# Patient Record
Sex: FEMALE | Race: WHITE | NOT HISPANIC OR LATINO | Employment: FULL TIME | URBAN - METROPOLITAN AREA
[De-identification: names, ages, dates, MRNs, and addresses within clinical notes are randomized per-mention and may not be internally consistent; named-entity substitution may affect disease eponyms.]

---

## 2023-08-08 ENCOUNTER — TELEPHONE (OUTPATIENT)
Dept: PRIMARY CARE | Facility: CLINIC | Age: 33
End: 2023-08-08
Payer: COMMERCIAL

## 2023-08-08 PROBLEM — R92.30 DENSE BREAST TISSUE ON MAMMOGRAM: Status: ACTIVE | Noted: 2023-08-08

## 2023-08-08 PROBLEM — I45.81 PROLONGED QT SYNDROME: Status: ACTIVE | Noted: 2023-08-08

## 2023-08-08 PROBLEM — R30.0 DYSURIA: Status: RESOLVED | Noted: 2023-08-08 | Resolved: 2023-08-08

## 2023-08-08 PROBLEM — R10.9 FLANK PAIN, ACUTE: Status: ACTIVE | Noted: 2023-08-08

## 2023-08-08 PROBLEM — R92.8 MAMMOGRAM ABNORMAL: Status: ACTIVE | Noted: 2023-08-08

## 2023-08-08 PROBLEM — G91.9 HYDROCEPHALUS, ADULT (MULTI): Status: ACTIVE | Noted: 2023-08-08

## 2023-08-08 PROBLEM — G43.909 MIGRAINE, UNSPECIFIED, NOT INTRACTABLE, WITHOUT STATUS MIGRAINOSUS: Status: ACTIVE | Noted: 2023-08-08

## 2023-08-08 PROBLEM — N05.8: Status: ACTIVE | Noted: 2023-08-08

## 2023-08-08 PROBLEM — R41.840 INATTENTION: Status: ACTIVE | Noted: 2023-08-08

## 2023-08-08 PROBLEM — R19.7 DIARRHEA: Status: RESOLVED | Noted: 2023-08-08 | Resolved: 2023-08-08

## 2023-08-08 PROBLEM — M54.50 LOW BACK PAIN: Status: ACTIVE | Noted: 2023-08-08

## 2023-08-08 PROBLEM — F41.8 SITUATIONAL ANXIETY: Status: ACTIVE | Noted: 2023-08-08

## 2023-08-08 PROBLEM — N93.8 DUB (DYSFUNCTIONAL UTERINE BLEEDING): Status: ACTIVE | Noted: 2023-08-08

## 2023-08-08 PROBLEM — G44.009 CLUSTER HEADACHE: Status: ACTIVE | Noted: 2023-08-08

## 2023-08-08 PROBLEM — R10.31 ABDOMINAL PAIN, RLQ (RIGHT LOWER QUADRANT): Status: ACTIVE | Noted: 2023-08-08

## 2023-08-08 PROBLEM — G43.009 MIGRAINE WITHOUT AURA AND WITHOUT STATUS MIGRAINOSUS, NOT INTRACTABLE: Status: ACTIVE | Noted: 2023-08-08

## 2023-08-08 PROBLEM — G47.00 INSOMNIA: Status: ACTIVE | Noted: 2023-08-08

## 2023-08-08 PROBLEM — R79.89 LOW VITAMIN D LEVEL: Status: ACTIVE | Noted: 2023-08-08

## 2023-08-08 RX ORDER — AMITRIPTYLINE HYDROCHLORIDE 10 MG/1
10 TABLET, FILM COATED ORAL NIGHTLY
COMMUNITY
Start: 2023-01-30

## 2023-08-08 RX ORDER — SUMATRIPTAN 50 MG/1
TABLET, FILM COATED ORAL
COMMUNITY

## 2023-08-08 RX ORDER — TRAMADOL HYDROCHLORIDE 50 MG/1
50 TABLET ORAL EVERY 6 HOURS
COMMUNITY
Start: 2023-03-23

## 2023-08-08 RX ORDER — SUMATRIPTAN SUCCINATE 4 MG/.5ML
INJECTION, SOLUTION SUBCUTANEOUS
COMMUNITY
Start: 2023-02-08

## 2023-08-08 RX ORDER — METOPROLOL SUCCINATE 25 MG/1
TABLET, EXTENDED RELEASE ORAL
COMMUNITY

## 2023-08-08 RX ORDER — RIZATRIPTAN BENZOATE 10 MG/1
TABLET ORAL
COMMUNITY
Start: 2023-02-08

## 2023-08-08 RX ORDER — IBUPROFEN 800 MG/1
TABLET ORAL
COMMUNITY
Start: 2022-12-09

## 2023-08-08 RX ORDER — GALCANEZUMAB 100 MG/ML
INJECTION, SOLUTION SUBCUTANEOUS
COMMUNITY
Start: 2023-02-08

## 2023-08-08 RX ORDER — LIDOCAINE 50 MG/G
PATCH TOPICAL
COMMUNITY
Start: 2023-03-23

## 2023-08-08 RX ORDER — CYCLOBENZAPRINE HCL 10 MG
TABLET ORAL
COMMUNITY
Start: 2019-06-19

## 2023-08-08 RX ORDER — ONDANSETRON 4 MG/1
4 TABLET, ORALLY DISINTEGRATING ORAL 3 TIMES DAILY
COMMUNITY
Start: 2023-03-23

## 2023-08-08 RX ORDER — MELOXICAM 15 MG/1
15 TABLET ORAL DAILY PRN
COMMUNITY
Start: 2023-07-12

## 2023-08-08 NOTE — TELEPHONE ENCOUNTER
Pt called having abd pain and vomiting x 5 days. Lupe her for tomorrow 8 am squeeze. She wanted seen today no openings. She said if she got worse she would go to the ER.

## 2023-08-09 ENCOUNTER — OFFICE VISIT (OUTPATIENT)
Dept: PRIMARY CARE | Facility: CLINIC | Age: 33
End: 2023-08-09
Payer: COMMERCIAL

## 2023-08-09 VITALS
DIASTOLIC BLOOD PRESSURE: 82 MMHG | TEMPERATURE: 98.2 F | WEIGHT: 183 LBS | OXYGEN SATURATION: 99 % | HEART RATE: 110 BPM | BODY MASS INDEX: 25.7 KG/M2 | SYSTOLIC BLOOD PRESSURE: 142 MMHG

## 2023-08-09 DIAGNOSIS — G91.9 HYDROCEPHALUS, ADULT (MULTI): ICD-10-CM

## 2023-08-09 DIAGNOSIS — E86.0 DEHYDRATION: Primary | ICD-10-CM

## 2023-08-09 DIAGNOSIS — R11.2 NAUSEA AND VOMITING, UNSPECIFIED VOMITING TYPE: ICD-10-CM

## 2023-08-09 DIAGNOSIS — R10.13 EPIGASTRIC PAIN: ICD-10-CM

## 2023-08-09 DIAGNOSIS — R19.7 DIARRHEA OF PRESUMED INFECTIOUS ORIGIN: ICD-10-CM

## 2023-08-09 PROCEDURE — 99214 OFFICE O/P EST MOD 30 MIN: CPT | Performed by: FAMILY MEDICINE

## 2023-08-09 PROCEDURE — 1036F TOBACCO NON-USER: CPT | Performed by: FAMILY MEDICINE

## 2023-08-09 ASSESSMENT — ENCOUNTER SYMPTOMS
CONSTIPATION: 0
DIARRHEA: 1
NAUSEA: 1
VOMITING: 1
DYSURIA: 0
FEVER: 1
ABDOMINAL PAIN: 1
BLOOD IN STOOL: 0

## 2023-08-09 NOTE — PATIENT INSTRUCTIONS
Suspected gastroenteritis  Patient orthostatic with dehydration    Discussed option with the patient we can do medications, push the fluids get blood work  Given that she is orthostatic and feeling pretty unwell would recommend IV fluids in the ER patient agrees sent over to the ER

## 2023-08-09 NOTE — PROGRESS NOTES
Subjective   Patient ID: Alexandria Dinero is a 33 y.o. female who presents for Abdominal Pain.    Cramping  epigastric after eating 15-20 min ,  anything     N/V   5-6 times with vomiting over last week   Diarrhea   2-3 times per day     Chills feverish   No specific triggers     Emgality for 5-6 months   No relief with TUMs   Kaopectate no relief   Tried immodium , pepto     Abdominal Pain  This is a new problem. The current episode started in the past 7 days. The onset quality is sudden. The problem occurs constantly. The most recent episode lasted 1 week. The problem has been unchanged. The pain is located in the epigastric region and RUQ. The pain is at a severity of 6/10. The pain is moderate. The quality of the pain is dull, cramping and sharp. The abdominal pain radiates to the RUQ and epigastric region. Associated symptoms include diarrhea, a fever, nausea and vomiting. Pertinent negatives include no constipation or dysuria. The pain is aggravated by eating. The pain is relieved by Nothing. She has tried antacids for the symptoms. The treatment provided no relief.        Review of Systems   Constitutional:  Positive for fever.   Gastrointestinal:  Positive for abdominal pain, diarrhea, nausea and vomiting. Negative for blood in stool and constipation.   Genitourinary:  Negative for dysuria.       Objective   /68   Pulse 110   Temp 36.8 °C (98.2 °F)   Wt 83 kg (183 lb)   SpO2 99%   BMI 25.70 kg/m²     Physical Exam  Constitutional:       Appearance: Normal appearance. She is well-developed.   Cardiovascular:      Rate and Rhythm: Normal rate and regular rhythm.      Heart sounds: Normal heart sounds. No murmur heard.  Pulmonary:      Effort: Pulmonary effort is normal.      Breath sounds: Normal breath sounds.   Abdominal:      General: Abdomen is flat.      Palpations: Abdomen is soft.      Tenderness: There is abdominal tenderness. There is no guarding.      Comments: Tenderness epigastric area with  deep palpation  No guarding or rebound   Neurological:      General: No focal deficit present.      Mental Status: She is alert.         Assessment/Plan   Problem List Items Addressed This Visit       Hydrocephalus, adult (CMS/[facility])     Other Visit Diagnoses       Dehydration    -  Primary    Epigastric pain        Nausea and vomiting, unspecified vomiting type        Diarrhea of presumed infectious origin

## 2023-10-06 ENCOUNTER — PHARMACY VISIT (OUTPATIENT)
Dept: PHARMACY | Facility: CLINIC | Age: 33
End: 2023-10-06
Payer: MEDICAID

## 2023-10-06 ENCOUNTER — SPECIALTY PHARMACY (OUTPATIENT)
Dept: PHARMACY | Facility: CLINIC | Age: 33
End: 2023-10-06

## 2023-10-06 PROCEDURE — RXMED WILLOW AMBULATORY MEDICATION CHARGE

## 2023-10-26 ENCOUNTER — OFFICE VISIT (OUTPATIENT)
Dept: PRIMARY CARE | Facility: CLINIC | Age: 33
End: 2023-10-26
Payer: COMMERCIAL

## 2023-10-26 ENCOUNTER — ANCILLARY PROCEDURE (OUTPATIENT)
Dept: RADIOLOGY | Facility: CLINIC | Age: 33
End: 2023-10-26
Payer: COMMERCIAL

## 2023-10-26 VITALS
DIASTOLIC BLOOD PRESSURE: 67 MMHG | OXYGEN SATURATION: 99 % | WEIGHT: 183 LBS | HEIGHT: 71 IN | SYSTOLIC BLOOD PRESSURE: 102 MMHG | TEMPERATURE: 98.1 F | HEART RATE: 94 BPM | BODY MASS INDEX: 25.62 KG/M2

## 2023-10-26 DIAGNOSIS — S69.91XA INJURY OF FINGER OF RIGHT HAND, INITIAL ENCOUNTER: ICD-10-CM

## 2023-10-26 DIAGNOSIS — G43.009 MIGRAINE WITHOUT AURA AND WITHOUT STATUS MIGRAINOSUS, NOT INTRACTABLE: ICD-10-CM

## 2023-10-26 DIAGNOSIS — S69.91XA INJURY OF FINGER OF RIGHT HAND, INITIAL ENCOUNTER: Primary | ICD-10-CM

## 2023-10-26 DIAGNOSIS — G91.9 HYDROCEPHALUS, ADULT (MULTI): ICD-10-CM

## 2023-10-26 PROCEDURE — 1036F TOBACCO NON-USER: CPT | Performed by: FAMILY MEDICINE

## 2023-10-26 PROCEDURE — 73140 X-RAY EXAM OF FINGER(S): CPT | Mod: RT,FY

## 2023-10-26 PROCEDURE — 99214 OFFICE O/P EST MOD 30 MIN: CPT | Performed by: FAMILY MEDICINE

## 2023-10-26 PROCEDURE — 73140 X-RAY EXAM OF FINGER(S): CPT | Mod: RIGHT SIDE | Performed by: RADIOLOGY

## 2023-10-26 RX ORDER — AMOXICILLIN AND CLAVULANATE POTASSIUM 875; 125 MG/1; MG/1
875 TABLET, FILM COATED ORAL 2 TIMES DAILY
Qty: 20 TABLET | Refills: 0 | Status: SHIPPED | OUTPATIENT
Start: 2023-10-26 | End: 2023-11-05

## 2023-10-26 ASSESSMENT — PAIN SCALES - GENERAL: PAINLEVEL: 4

## 2023-10-26 NOTE — PATIENT INSTRUCTIONS
Xray of the finger to rule out distal fracture    Discussed signs of compartment syndrome and infection with the patient  Take antibiotics as directed  If numbness or tingling with increased swelling or decreased feeling in the finger needs evaluation    Migraines with hydrocephalus: Continue follow-up with neurology

## 2023-10-26 NOTE — PROGRESS NOTES
"Subjective   Patient ID: Alexandria Dinero is a 33 y.o. female who presents for finger accident; states this occurred yesterday afternoon; finger was smashed in a car door. Soaking her finger in Epsom salts and warm water and taking Tylenol and Ibuprofen.     Slammed in car dog yesterday   Having significant pain at the tip of the finger but tolerable, had some bleeding but it stopped and the nail is lifting  Pulsatile pain, not numbness    Up-to-date on tetanus    Has migraines: Saw neurologist, repeated CT  She does have increase in her ventricular size, she is being referred and may need to get a shunt  She is also taking Emgality which is helping with the migraines         Review of Systems    Objective   /67   Pulse 94   Temp 36.7 °C (98.1 °F)   Ht 1.797 m (5' 10.75\")   Wt 83 kg (183 lb)   SpO2 99%   BMI 25.70 kg/m²     Physical Exam  Constitutional:       Appearance: Normal appearance.   HENT:      Head: Normocephalic.   Musculoskeletal:      Comments: Right middle finger with bruising and edema on the distal finger, range of motion intact, flexion intact  Sensation intact, some bruising  Lifting of the nail without active bleeding   Neurological:      Mental Status: She is alert.   Psychiatric:         Mood and Affect: Mood normal.         Behavior: Behavior normal.         Assessment/Plan   Problem List Items Addressed This Visit             ICD-10-CM    Hydrocephalus, adult (CMS/[facility]) G91.9    Migraine without aura and without status migrainosus, not intractable G43.009     Other Visit Diagnoses         Codes    Injury of finger of right hand, initial encounter    -  Primary S69.91XA    Relevant Medications    amoxicillin-pot clavulanate (Augmentin) 875-125 mg tablet    Other Relevant Orders    XR fingers right 2+ views               "

## 2023-10-31 ENCOUNTER — SPECIALTY PHARMACY (OUTPATIENT)
Dept: PHARMACY | Facility: CLINIC | Age: 33
End: 2023-10-31

## 2023-10-31 DIAGNOSIS — G44.029 CHRONIC CLUSTER HEADACHE, NOT INTRACTABLE: ICD-10-CM

## 2023-10-31 RX ORDER — GALCANEZUMAB 100 MG/ML
INJECTION, SOLUTION SUBCUTANEOUS
Qty: 3 ML | Refills: 6 | Status: SHIPPED | OUTPATIENT
Start: 2023-10-31 | End: 2024-10-30

## 2023-11-03 ENCOUNTER — PHARMACY VISIT (OUTPATIENT)
Dept: PHARMACY | Facility: CLINIC | Age: 33
End: 2023-11-03
Payer: MEDICAID

## 2023-11-03 ENCOUNTER — SPECIALTY PHARMACY (OUTPATIENT)
Dept: PHARMACY | Facility: CLINIC | Age: 33
End: 2023-11-03

## 2023-11-03 PROCEDURE — RXMED WILLOW AMBULATORY MEDICATION CHARGE

## 2023-11-08 ENCOUNTER — APPOINTMENT (OUTPATIENT)
Dept: ORTHOPEDIC SURGERY | Facility: CLINIC | Age: 33
End: 2023-11-08
Payer: COMMERCIAL

## 2023-11-28 ENCOUNTER — SPECIALTY PHARMACY (OUTPATIENT)
Dept: PHARMACY | Facility: CLINIC | Age: 33
End: 2023-11-28

## 2023-11-28 ENCOUNTER — PHARMACY VISIT (OUTPATIENT)
Dept: PHARMACY | Facility: CLINIC | Age: 33
End: 2023-11-28
Payer: MEDICAID

## 2023-11-28 PROCEDURE — RXMED WILLOW AMBULATORY MEDICATION CHARGE

## 2023-11-28 NOTE — PROGRESS NOTES
[facility] Clinical Note    Alexandria Dinero is a 33 y.o. female, who is on the specialty pharmacy service for management of: Migraine Core with status of: (Enrolled)     Alexandria was contacted on 11/28/2023.    Refer to the encounter summary report for documentation details about patient counseling and education.      Medication Adherence  The importance of adherence was discussed with the patient and they were advised to take the medication as prescribed by their provider. Alexandria was encouraged to call her physician's office if they have a question regarding a missed dose.     Conclusion  Rate your quality of life on scale of 1-10: 10 - Completely satisfied  Rate your satisfaction with  Specialty Pharmacy on scale of 1-10: 10 - Completely satisfied      Patient advised to contact the pharmacy if there are any changes to her medication list, including prescriptions, OTC medications, herbal products, or supplements. Patient was advised of [facility]’s dispensing process, refill timeline, contact information ([phone]), and patient management follow up. Patient confirmed understanding of education conducted during assessment. All patient questions and concerns were addressed to the best of my ability. Patient was encouraged to contact the specialty pharmacy with any questions or concerns.    Confirmed follow-up outreaches are properly scheduled. Reviewed goals of therapy in the program targets.    Sy Kingsley, ViridianaD

## 2023-12-07 ENCOUNTER — SPECIALTY PHARMACY (OUTPATIENT)
Dept: PHARMACY | Facility: CLINIC | Age: 33
End: 2023-12-07

## 2023-12-12 ENCOUNTER — SPECIALTY PHARMACY (OUTPATIENT)
Dept: PHARMACY | Facility: CLINIC | Age: 33
End: 2023-12-12

## 2023-12-21 ENCOUNTER — SPECIALTY PHARMACY (OUTPATIENT)
Dept: PHARMACY | Facility: CLINIC | Age: 33
End: 2023-12-21

## 2023-12-22 ENCOUNTER — OFFICE VISIT (OUTPATIENT)
Dept: PRIMARY CARE | Facility: CLINIC | Age: 33
End: 2023-12-22
Payer: COMMERCIAL

## 2023-12-22 VITALS
HEART RATE: 81 BPM | OXYGEN SATURATION: 98 % | WEIGHT: 183 LBS | DIASTOLIC BLOOD PRESSURE: 69 MMHG | TEMPERATURE: 98.1 F | SYSTOLIC BLOOD PRESSURE: 108 MMHG | BODY MASS INDEX: 25.62 KG/M2 | HEIGHT: 71 IN

## 2023-12-22 DIAGNOSIS — R10.31 ABDOMINAL PAIN, RLQ (RIGHT LOWER QUADRANT): ICD-10-CM

## 2023-12-22 DIAGNOSIS — N30.00 ACUTE CYSTITIS WITHOUT HEMATURIA: Primary | ICD-10-CM

## 2023-12-22 LAB
POC APPEARANCE, URINE: CLEAR
POC BILIRUBIN, URINE: NEGATIVE
POC BLOOD, URINE: ABNORMAL
POC COLOR, URINE: YELLOW
POC GLUCOSE, URINE: NEGATIVE MG/DL
POC KETONES, URINE: NEGATIVE MG/DL
POC LEUKOCYTES, URINE: ABNORMAL
POC NITRITE,URINE: NEGATIVE
POC PH, URINE: 6 PH
POC PROTEIN, URINE: ABNORMAL MG/DL
POC SPECIFIC GRAVITY, URINE: >=1.03
POC UROBILINOGEN, URINE: 0.2 EU/DL

## 2023-12-22 PROCEDURE — 99213 OFFICE O/P EST LOW 20 MIN: CPT | Performed by: FAMILY MEDICINE

## 2023-12-22 PROCEDURE — 81003 URINALYSIS AUTO W/O SCOPE: CPT | Performed by: FAMILY MEDICINE

## 2023-12-22 PROCEDURE — 87186 SC STD MICRODIL/AGAR DIL: CPT

## 2023-12-22 PROCEDURE — 87086 URINE CULTURE/COLONY COUNT: CPT

## 2023-12-22 PROCEDURE — 1036F TOBACCO NON-USER: CPT | Performed by: FAMILY MEDICINE

## 2023-12-22 RX ORDER — CEPHALEXIN 500 MG/1
500 CAPSULE ORAL 3 TIMES DAILY
Qty: 15 CAPSULE | Refills: 0 | Status: SHIPPED | OUTPATIENT
Start: 2023-12-22 | End: 2023-12-27

## 2023-12-22 RX ORDER — DEXTRAN, HYPROMELLOSE 1; 3 MG/ML; MG/ML
SOLUTION/ DROPS OPHTHALMIC
COMMUNITY
Start: 2023-12-13

## 2023-12-22 ASSESSMENT — ENCOUNTER SYMPTOMS
CHILLS: 1
FLANK PAIN: 1

## 2023-12-22 NOTE — PROGRESS NOTES
"Subjective   Patient ID: Alexandria Dinero is a 33 y.o. female who presents for UTI.    UTI   This is a new problem. The current episode started in the past 7 days. The problem occurs every urination. The problem has been gradually worsening. The quality of the pain is described as stabbing and aching. The pain is at a severity of 5/10. The pain is mild. There has been no fever. Associated symptoms include chills, flank pain and urgency.        Review of Systems   Constitutional:  Positive for chills.   Genitourinary:  Positive for flank pain and urgency.       Objective   Ht 1.797 m (5' 10.75\")   Wt 83 kg (183 lb)   BMI 25.70 kg/m²     Physical Exam  HENT:      Head: Normocephalic and atraumatic.      Nose: Nose normal.      Mouth/Throat:      Mouth: Mucous membranes are moist.      Pharynx: No oropharyngeal exudate.   Eyes:      Extraocular Movements: Extraocular movements intact.      Conjunctiva/sclera: Conjunctivae normal.      Pupils: Pupils are equal, round, and reactive to light.   Cardiovascular:      Rate and Rhythm: Normal rate and regular rhythm.   Pulmonary:      Effort: Pulmonary effort is normal.   Abdominal:      General: There is no distension.      Palpations: Abdomen is soft.      Tenderness: There is no right CVA tenderness or left CVA tenderness.   Musculoskeletal:      Cervical back: Normal range of motion and neck supple.   Lymphadenopathy:      Cervical: No cervical adenopathy.   Neurological:      General: No focal deficit present.      Mental Status: She is alert.   Psychiatric:         Attention and Perception: Attention normal.         Speech: Speech normal.         Behavior: Behavior is cooperative.         Assessment/Plan   Diagnoses and all orders for this visit:  Acute cystitis without hematuria  Comments:  Push water  Risk benefits discussed  Add medication as directed  Orders:  -     cephalexin (Keflex) 500 mg capsule; Take 1 capsule (500 mg) by mouth 3 times a day for 5 " days.  Abdominal pain, RLQ (right lower quadrant)  -     POCT UA Automated manually resulted  -     Urine Culture; Future    Recheck 1 week if not better sooner if worse

## 2023-12-25 LAB — BACTERIA UR CULT: ABNORMAL

## 2023-12-27 ENCOUNTER — SPECIALTY PHARMACY (OUTPATIENT)
Dept: PHARMACY | Facility: CLINIC | Age: 33
End: 2023-12-27

## 2023-12-27 PROCEDURE — RXMED WILLOW AMBULATORY MEDICATION CHARGE

## 2023-12-28 ENCOUNTER — TELEPHONE (OUTPATIENT)
Dept: PRIMARY CARE | Facility: CLINIC | Age: 33
End: 2023-12-28
Payer: COMMERCIAL

## 2023-12-28 NOTE — TELEPHONE ENCOUNTER
Result Communication    Resulted Orders   POCT UA Automated manually resulted   Result Value Ref Range    POC Color, Urine Yellow Straw, Yellow, Light-Yellow    POC Appearance, Urine Clear Clear    POC Glucose, Urine NEGATIVE NEGATIVE mg/dl    POC Bilirubin, Urine NEGATIVE NEGATIVE    POC Ketones, Urine NEGATIVE NEGATIVE mg/dl    POC Specific Gravity, Urine >=1.030 1.005 - 1.035    POC Blood, Urine SMALL (1+) (A) NEGATIVE    POC PH, Urine 6.0 No Reference Range Established PH    POC Protein, Urine 100 (2+) (A) NEGATIVE, 30 (1+) mg/dl    POC Urobilinogen, Urine 0.2 0.2, 1.0 EU/DL    Poc Nitrite, Urine NEGATIVE NEGATIVE    POC Leukocytes, Urine SMALL (1+) (A) NEGATIVE   Urine Culture   Result Value Ref Range    Urine Culture >100,000 Klebsiella pneumoniae/variicola (A)        9:48 AM      Results were successfully communicated with the patient and they acknowledged their understanding.

## 2024-01-02 ENCOUNTER — PHARMACY VISIT (OUTPATIENT)
Dept: PHARMACY | Facility: CLINIC | Age: 34
End: 2024-01-02
Payer: MEDICAID

## 2024-01-24 DIAGNOSIS — R92.8 MAMMOGRAM ABNORMAL: Primary | ICD-10-CM

## 2024-01-30 ENCOUNTER — SPECIALTY PHARMACY (OUTPATIENT)
Dept: PHARMACY | Facility: CLINIC | Age: 34
End: 2024-01-30

## 2024-01-31 ENCOUNTER — APPOINTMENT (OUTPATIENT)
Dept: RADIOLOGY | Facility: HOSPITAL | Age: 34
End: 2024-01-31
Payer: COMMERCIAL

## 2024-02-08 ENCOUNTER — OFFICE VISIT (OUTPATIENT)
Dept: NEUROLOGY | Facility: CLINIC | Age: 34
End: 2024-02-08
Payer: COMMERCIAL

## 2024-02-08 VITALS — SYSTOLIC BLOOD PRESSURE: 114 MMHG | DIASTOLIC BLOOD PRESSURE: 72 MMHG | RESPIRATION RATE: 16 BRPM | TEMPERATURE: 97.3 F

## 2024-02-08 DIAGNOSIS — G43.009 MIGRAINE WITHOUT AURA AND WITHOUT STATUS MIGRAINOSUS, NOT INTRACTABLE: ICD-10-CM

## 2024-02-08 PROCEDURE — 1036F TOBACCO NON-USER: CPT | Performed by: PSYCHIATRY & NEUROLOGY

## 2024-02-08 PROCEDURE — 99212 OFFICE O/P EST SF 10 MIN: CPT | Performed by: PSYCHIATRY & NEUROLOGY

## 2024-02-08 RX ORDER — SUMATRIPTAN SUCCINATE 100 MG/1
100 TABLET ORAL ONCE AS NEEDED
Qty: 9 TABLET | Refills: 6 | Status: SHIPPED | OUTPATIENT
Start: 2024-02-08

## 2024-02-08 RX ORDER — FREMANEZUMAB-VFRM 225 MG/1.5ML
225 INJECTION SUBCUTANEOUS
Qty: 2 ML | Refills: 0 | COMMUNITY
Start: 2024-02-08 | End: 2024-02-12 | Stop reason: ALTCHOICE

## 2024-02-08 NOTE — PROGRESS NOTES
CGRP working well. Unable access with insurance currently.   Samples of Ajovy to continue monthly.

## 2024-02-14 ENCOUNTER — APPOINTMENT (OUTPATIENT)
Dept: RADIOLOGY | Facility: HOSPITAL | Age: 34
End: 2024-02-14
Payer: COMMERCIAL

## 2024-03-11 ENCOUNTER — SPECIALTY PHARMACY (OUTPATIENT)
Dept: PHARMACY | Facility: CLINIC | Age: 34
End: 2024-03-11

## 2024-03-27 ENCOUNTER — OFFICE VISIT (OUTPATIENT)
Dept: NEUROLOGY | Facility: CLINIC | Age: 34
End: 2024-03-27

## 2024-03-27 VITALS — DIASTOLIC BLOOD PRESSURE: 70 MMHG | HEART RATE: 72 BPM | SYSTOLIC BLOOD PRESSURE: 100 MMHG | RESPIRATION RATE: 20 BRPM

## 2024-03-27 DIAGNOSIS — G43.009 MIGRAINE WITHOUT AURA AND WITHOUT STATUS MIGRAINOSUS, NOT INTRACTABLE: ICD-10-CM

## 2024-03-27 PROCEDURE — 99211 OFF/OP EST MAY X REQ PHY/QHP: CPT | Performed by: PSYCHIATRY & NEUROLOGY

## 2024-03-27 PROCEDURE — 1036F TOBACCO NON-USER: CPT | Performed by: PSYCHIATRY & NEUROLOGY

## 2024-03-27 RX ORDER — FREMANEZUMAB-VFRM 225 MG/1.5ML
225 INJECTION SUBCUTANEOUS
Qty: 1.5 ML | Refills: 0 | COMMUNITY
Start: 2024-03-27

## 2024-03-27 NOTE — PROGRESS NOTES
New insurance to start 5-1-2024 with  Employee plan. Hopeful for approval of Emgality which works best for prevention. In lieu of unavailability of Emgality samples at this time, She will continue with Ajovy samples for migraine prevention. Sample dispensed.     Has failed amitriptyline and metoprolol

## 2024-04-05 ENCOUNTER — SPECIALTY PHARMACY (OUTPATIENT)
Dept: PHARMACY | Facility: CLINIC | Age: 34
End: 2024-04-05

## 2024-04-25 ENCOUNTER — SPECIALTY PHARMACY (OUTPATIENT)
Dept: PHARMACY | Facility: CLINIC | Age: 34
End: 2024-04-25

## 2024-04-25 DIAGNOSIS — G44.029 CHRONIC CLUSTER HEADACHE, NOT INTRACTABLE: ICD-10-CM

## 2024-04-25 PROCEDURE — RXMED WILLOW AMBULATORY MEDICATION CHARGE

## 2024-04-25 NOTE — TELEPHONE ENCOUNTER
Problem: Pain - Standard  Goal: Alleviation of pain or a reduction in pain to the patient’s comfort goal  Outcome: Progressing  Note: Patient medicated per MAR.      Problem: Psychosocial  Goal: Patient's ability to identify and develop effective coping behaviors will improve  Outcome: Not Progressing  Note: Patient has a difficult time expressing thoughts in a orderly manner. Patient repeats herself often.      Problem: Hemodynamics  Goal: Patient's hemodynamics, fluid balance and neurologic status will be stable or improve  Outcome: Progressing     Problem: Knowledge Deficit - Standard  Goal: Patient and family/care givers will demonstrate understanding of plan of care, disease process/condition, diagnostic tests and medications  Outcome: Not Progressing  Note: Patient is axo x3. Patient is confused to situation. Patient is confused in conversation, repeating the same subjects multiple times. Patient needs redirecting for axo status.      Problem: Fall Risk  Goal: Patient will remain free from falls  Outcome: Progressing   The patient is Stable - Low risk of patient condition declining or worsening    Shift Goals  Clinical Goals: fluid restriction, i/os, monitor labs, safety  Patient Goals: sleep, more water  Family Goals: n/a    Progress made toward(s) clinical / shift goals:      Patient is not progressing towards the following goals:      Problem: Psychosocial  Goal: Patient's ability to identify and develop effective coping behaviors will improve  Outcome: Not Progressing  Note: Patient has a difficult time expressing thoughts in a orderly manner. Patient repeats herself often.      Problem: Knowledge Deficit - Standard  Goal: Patient and family/care givers will demonstrate understanding of plan of care, disease process/condition, diagnostic tests and medications  Outcome: Not Progressing  Note: Patient is axo x3. Patient is confused to situation. Patient is confused in conversation, repeating the same  Requesting new script to [facility]  Left pt a message re. New insurance.    subjects multiple times. Patient needs redirecting for axo status.

## 2024-04-26 RX ORDER — GALCANEZUMAB 100 MG/ML
INJECTION, SOLUTION SUBCUTANEOUS
Qty: 3 ML | Refills: 6 | OUTPATIENT
Start: 2024-04-26 | End: 2025-04-26

## 2024-04-27 ENCOUNTER — PHARMACY VISIT (OUTPATIENT)
Dept: PHARMACY | Facility: CLINIC | Age: 34
End: 2024-04-27
Payer: MEDICAID

## 2024-05-02 ENCOUNTER — APPOINTMENT (OUTPATIENT)
Dept: NEUROLOGY | Facility: CLINIC | Age: 34
End: 2024-05-02

## 2024-05-20 ENCOUNTER — SPECIALTY PHARMACY (OUTPATIENT)
Dept: PHARMACY | Facility: CLINIC | Age: 34
End: 2024-05-20

## 2024-05-20 PROCEDURE — RXMED WILLOW AMBULATORY MEDICATION CHARGE

## 2024-05-29 ENCOUNTER — SPECIALTY PHARMACY (OUTPATIENT)
Dept: PHARMACY | Facility: CLINIC | Age: 34
End: 2024-05-29

## 2024-06-04 ENCOUNTER — PHARMACY VISIT (OUTPATIENT)
Dept: PHARMACY | Facility: CLINIC | Age: 34
End: 2024-06-04
Payer: MEDICAID

## 2024-06-18 ENCOUNTER — SPECIALTY PHARMACY (OUTPATIENT)
Dept: PHARMACY | Facility: CLINIC | Age: 34
End: 2024-06-18

## 2024-07-01 ENCOUNTER — SPECIALTY PHARMACY (OUTPATIENT)
Dept: PHARMACY | Facility: CLINIC | Age: 34
End: 2024-07-01

## 2024-07-01 PROCEDURE — RXMED WILLOW AMBULATORY MEDICATION CHARGE

## 2024-07-02 ENCOUNTER — PHARMACY VISIT (OUTPATIENT)
Dept: PHARMACY | Facility: CLINIC | Age: 34
End: 2024-07-02
Payer: MEDICAID

## 2024-07-12 PROBLEM — S69.90XA INJURY OF HAND: Status: RESOLVED | Noted: 2024-07-12 | Resolved: 2024-07-12

## 2024-07-12 PROBLEM — S60.229A CONTUSION OF HAND: Status: RESOLVED | Noted: 2024-07-12 | Resolved: 2024-07-12

## 2024-07-12 PROBLEM — M25.511 RIGHT SHOULDER PAIN: Status: RESOLVED | Noted: 2024-07-12 | Resolved: 2024-07-12

## 2024-07-16 ENCOUNTER — APPOINTMENT (OUTPATIENT)
Dept: PRIMARY CARE | Facility: CLINIC | Age: 34
End: 2024-07-16
Payer: COMMERCIAL

## 2024-07-19 PROBLEM — M75.01 ADHESIVE CAPSULITIS OF RIGHT SHOULDER: Status: ACTIVE | Noted: 2024-07-19

## 2024-07-19 RX ORDER — NORETHINDRONE ACETATE/ETHINYL ESTRADIOL 1MG-20MCG
1 KIT ORAL
COMMUNITY
Start: 2024-05-15

## 2024-07-23 ENCOUNTER — APPOINTMENT (OUTPATIENT)
Dept: PRIMARY CARE | Facility: CLINIC | Age: 34
End: 2024-07-23
Payer: COMMERCIAL

## 2024-07-23 ENCOUNTER — LAB (OUTPATIENT)
Dept: LAB | Facility: LAB | Age: 34
End: 2024-07-23
Payer: COMMERCIAL

## 2024-07-23 VITALS
OXYGEN SATURATION: 97 % | DIASTOLIC BLOOD PRESSURE: 64 MMHG | HEART RATE: 84 BPM | BODY MASS INDEX: 25.06 KG/M2 | HEIGHT: 71 IN | WEIGHT: 179 LBS | TEMPERATURE: 98.2 F | SYSTOLIC BLOOD PRESSURE: 99 MMHG

## 2024-07-23 DIAGNOSIS — R79.89 LOW VITAMIN D LEVEL: ICD-10-CM

## 2024-07-23 DIAGNOSIS — Z00.00 ROUTINE GENERAL MEDICAL EXAMINATION AT A HEALTH CARE FACILITY: Primary | ICD-10-CM

## 2024-07-23 DIAGNOSIS — F51.01 PRIMARY INSOMNIA: ICD-10-CM

## 2024-07-23 DIAGNOSIS — N39.0 RECURRENT UTI: ICD-10-CM

## 2024-07-23 DIAGNOSIS — G43.009 MIGRAINE WITHOUT AURA AND WITHOUT STATUS MIGRAINOSUS, NOT INTRACTABLE: ICD-10-CM

## 2024-07-23 DIAGNOSIS — G91.9 HYDROCEPHALUS, ADULT (MULTI): ICD-10-CM

## 2024-07-23 DIAGNOSIS — Z00.00 ROUTINE GENERAL MEDICAL EXAMINATION AT A HEALTH CARE FACILITY: ICD-10-CM

## 2024-07-23 DIAGNOSIS — R30.0 DYSURIA: ICD-10-CM

## 2024-07-23 PROBLEM — I45.81 PROLONGED QT SYNDROME: Status: RESOLVED | Noted: 2023-08-08 | Resolved: 2024-07-23

## 2024-07-23 PROBLEM — R10.31 ABDOMINAL PAIN, RLQ (RIGHT LOWER QUADRANT): Status: RESOLVED | Noted: 2023-08-08 | Resolved: 2024-07-23

## 2024-07-23 PROBLEM — R41.840 INATTENTION: Status: RESOLVED | Noted: 2023-08-08 | Resolved: 2024-07-23

## 2024-07-23 PROBLEM — N05.8: Status: RESOLVED | Noted: 2023-08-08 | Resolved: 2024-07-23

## 2024-07-23 PROBLEM — R10.9 FLANK PAIN, ACUTE: Status: RESOLVED | Noted: 2023-08-08 | Resolved: 2024-07-23

## 2024-07-23 PROBLEM — F41.8 SITUATIONAL ANXIETY: Status: RESOLVED | Noted: 2023-08-08 | Resolved: 2024-07-23

## 2024-07-23 LAB
ALBUMIN SERPL BCP-MCNC: 4.3 G/DL (ref 3.4–5)
ALP SERPL-CCNC: 43 U/L (ref 33–110)
ALT SERPL W P-5'-P-CCNC: 10 U/L (ref 7–45)
ANION GAP SERPL CALC-SCNC: 13 MMOL/L (ref 10–20)
AST SERPL W P-5'-P-CCNC: 16 U/L (ref 9–39)
BILIRUB SERPL-MCNC: 0.6 MG/DL (ref 0–1.2)
BUN SERPL-MCNC: 13 MG/DL (ref 6–23)
CALCIUM SERPL-MCNC: 9.3 MG/DL (ref 8.6–10.3)
CHLORIDE SERPL-SCNC: 103 MMOL/L (ref 98–107)
CHOLEST SERPL-MCNC: 162 MG/DL (ref 0–199)
CHOLESTEROL/HDL RATIO: 2.7
CO2 SERPL-SCNC: 28 MMOL/L (ref 21–32)
CREAT SERPL-MCNC: 0.74 MG/DL (ref 0.5–1.05)
EGFRCR SERPLBLD CKD-EPI 2021: >90 ML/MIN/1.73M*2
ERYTHROCYTE [DISTWIDTH] IN BLOOD BY AUTOMATED COUNT: 11.8 % (ref 11.5–14.5)
GLUCOSE SERPL-MCNC: 82 MG/DL (ref 74–99)
HCT VFR BLD AUTO: 40.1 % (ref 36–46)
HDLC SERPL-MCNC: 59.8 MG/DL
HGB BLD-MCNC: 13.6 G/DL (ref 12–16)
LDLC SERPL CALC-MCNC: 90 MG/DL
MCH RBC QN AUTO: 32.5 PG (ref 26–34)
MCHC RBC AUTO-ENTMCNC: 33.9 G/DL (ref 32–36)
MCV RBC AUTO: 96 FL (ref 80–100)
NON HDL CHOLESTEROL: 102 MG/DL (ref 0–149)
NRBC BLD-RTO: 0 /100 WBCS (ref 0–0)
PLATELET # BLD AUTO: 244 X10*3/UL (ref 150–450)
POC APPEARANCE, URINE: CLEAR
POC BILIRUBIN, URINE: NEGATIVE
POC BLOOD, URINE: NEGATIVE
POC COLOR, URINE: YELLOW
POC GLUCOSE, URINE: NEGATIVE MG/DL
POC KETONES, URINE: ABNORMAL MG/DL
POC LEUKOCYTES, URINE: ABNORMAL
POC NITRITE,URINE: NEGATIVE
POC PH, URINE: 7 PH
POC PROTEIN, URINE: NEGATIVE MG/DL
POC SPECIFIC GRAVITY, URINE: 1.02
POC UROBILINOGEN, URINE: 0.2 EU/DL
POTASSIUM SERPL-SCNC: 4.5 MMOL/L (ref 3.5–5.3)
PROT SERPL-MCNC: 7 G/DL (ref 6.4–8.2)
RBC # BLD AUTO: 4.18 X10*6/UL (ref 4–5.2)
SODIUM SERPL-SCNC: 139 MMOL/L (ref 136–145)
TRIGL SERPL-MCNC: 60 MG/DL (ref 0–149)
VLDL: 12 MG/DL (ref 0–40)
WBC # BLD AUTO: 8.8 X10*3/UL (ref 4.4–11.3)

## 2024-07-23 PROCEDURE — 80053 COMPREHEN METABOLIC PANEL: CPT

## 2024-07-23 PROCEDURE — 36415 COLL VENOUS BLD VENIPUNCTURE: CPT

## 2024-07-23 PROCEDURE — 3008F BODY MASS INDEX DOCD: CPT | Performed by: FAMILY MEDICINE

## 2024-07-23 PROCEDURE — 99395 PREV VISIT EST AGE 18-39: CPT | Performed by: FAMILY MEDICINE

## 2024-07-23 PROCEDURE — 85027 COMPLETE CBC AUTOMATED: CPT

## 2024-07-23 PROCEDURE — 1036F TOBACCO NON-USER: CPT | Performed by: FAMILY MEDICINE

## 2024-07-23 PROCEDURE — 80061 LIPID PANEL: CPT

## 2024-07-23 PROCEDURE — 87086 URINE CULTURE/COLONY COUNT: CPT

## 2024-07-23 PROCEDURE — 81003 URINALYSIS AUTO W/O SCOPE: CPT | Performed by: FAMILY MEDICINE

## 2024-07-23 PROCEDURE — 83036 HEMOGLOBIN GLYCOSYLATED A1C: CPT

## 2024-07-23 ASSESSMENT — PATIENT HEALTH QUESTIONNAIRE - PHQ9
1. LITTLE INTEREST OR PLEASURE IN DOING THINGS: NOT AT ALL
2. FEELING DOWN, DEPRESSED OR HOPELESS: NOT AT ALL
1. LITTLE INTEREST OR PLEASURE IN DOING THINGS: NOT AT ALL
SUM OF ALL RESPONSES TO PHQ9 QUESTIONS 1 AND 2: 0
SUM OF ALL RESPONSES TO PHQ9 QUESTIONS 1 AND 2: 0
2. FEELING DOWN, DEPRESSED OR HOPELESS: NOT AT ALL

## 2024-07-23 NOTE — PATIENT INSTRUCTIONS
Get your blood work as ordered.  You should hear from our office with results whether they are normal are not within a few days.  Please call the office if you do not hear from us.       After you get testing done you will get notified from our office with regards to your results whether they are normal or not.  If you are registered in the electronic health record we will send you information in that system.  If you have any questions or need clarification please feel free to call the office.     Cranberry tablets or juice routinely      Frequent fluid intake     Referral to urology       Probiotic     Miralax     Culturelle

## 2024-07-23 NOTE — PROGRESS NOTES
Subjective   Patient ID: Alexandria Dinero is a 34 y.o. female who presents for Annual Exam. C/O mild burning during urination. Pt would like to discuss ways to prevent recurrent UTI's.         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    EKG: 3/26/2021 done [facility] was normal      ROS :  ( No or Yes )  Any eye problems:    N  Frequent nasal congestion or sneezing:  N  Difficulty hearing:  N  Ear problems:   N  Asthma or wheezing:   N  Frequent cough:   N  Shortness of breath:N  Hemoptysis: N  Hx of TB: N  High blood pressure: N  Heart disease: N  Heart murmur:N  Chest pain or pressure with exertion:N  Leg pains with walking up hill: N  Fast heartbeat or palpitations:N  Varicose veins: N  Difficulty swallowing foods or liquids: N  Abdominal pains: y  Frequent indigestion or heartburn: N  Constipation: y  Diarrhea or loose stools: y  Weight changes recently: N  Change in bowel movements: N  An ulcer: N  Black stools: N  Jaundice, hepatitis or liver problems: N  Gallstones or gallbladder problems: N  Stomach or intestinal problems: y  Vomited blood : N  Blood in bowel movements: N  Sickle cell trait  or Anemia: N  Been refused as a blood donor: N  Problems with her kidney, bladder, or prostate: y  Loss of control of your urine: N  Pain or burning with urination: N  Blood in her urine: N  Trouble starting flow of urine: N  Frequent urination at night: y  History of venereal disease:  "N  Any skin problems: N  Diabetes: N  Thyroid disease: N  Frequent back pain: N  Pain or swelling around joints: N  Broken any bones: N  Frequent headaches: N  Dizziness: N  Have you ever had Seizures or convulsions: N  Have you ever temporarily lost control of your hand or foot : N   Had a stroke or been paralyzed : N  Temporarily lost your ability to speak: N  Fainted or lost consciousness: N  Hallucinations: N  Nervousness: N  Do you take medications for your nerves: N  Trouble falling asleep or staying asleep: y  Do you feel tired even after a good night sleep: y  Do you feel down in the dumps or depressed: N  Frequent crying: N  Using alcohol excessively: N  Any street drug use : N  Do have any other medical problems that are concerns :      Review of Systems    Objective   BP 99/64   Pulse 84   Temp 36.8 °C (98.2 °F)   Ht 1.791 m (5' 10.5\")   Wt 81.2 kg (179 lb)   LMP 06/04/2024   SpO2 97%   BMI 25.32 kg/m²     Physical Exam  Constitutional:       General: She is not in acute distress.     Appearance: Normal appearance.   HENT:      Head: Normocephalic and atraumatic.      Right Ear: Tympanic membrane, ear canal and external ear normal.      Left Ear: Tympanic membrane, ear canal and external ear normal.      Nose: Nose normal.      Mouth/Throat:      Mouth: Mucous membranes are moist.      Pharynx: No oropharyngeal exudate or posterior oropharyngeal erythema.   Eyes:      Extraocular Movements: Extraocular movements intact.      Conjunctiva/sclera: Conjunctivae normal.      Pupils: Pupils are equal, round, and reactive to light.   Cardiovascular:      Rate and Rhythm: Normal rate and regular rhythm.      Heart sounds: No murmur heard.  Pulmonary:      Effort: Pulmonary effort is normal.      Breath sounds: Normal breath sounds.   Chest:   Breasts:     Right: Normal.      Left: Normal.   Abdominal:      General: Bowel sounds are normal.      Palpations: Abdomen is soft.   Musculoskeletal:         " General: Normal range of motion.      Cervical back: No rigidity.   Lymphadenopathy:      Cervical: No cervical adenopathy.      Upper Body:      Right upper body: No axillary adenopathy.      Left upper body: No axillary adenopathy.   Skin:     General: Skin is warm and dry.      Findings: No rash.   Neurological:      General: No focal deficit present.      Mental Status: She is alert and oriented to person, place, and time.      Cranial Nerves: No cranial nerve deficit.      Gait: Gait normal.   Psychiatric:         Mood and Affect: Mood normal.         Behavior: Behavior normal.         Assessment/Plan   Problem List Items Addressed This Visit             ICD-10-CM    Hydrocephalus, adult (Multi) G91.9    Relevant Orders    CBC    Comprehensive Metabolic Panel    Lipid Panel    Hemoglobin A1C    Insomnia G47.00    Relevant Orders    CBC    Comprehensive Metabolic Panel    Lipid Panel    Hemoglobin A1C    Low vitamin D level R79.89    Relevant Orders    CBC    Comprehensive Metabolic Panel    Lipid Panel    Hemoglobin A1C    Migraine without aura and without status migrainosus, not intractable G43.009    Relevant Orders    CBC    Comprehensive Metabolic Panel    Lipid Panel    Hemoglobin A1C     Other Visit Diagnoses         Codes    Routine general medical examination at a health care facility    -  Primary Z00.00    Relevant Orders    CBC    Comprehensive Metabolic Panel    Lipid Panel    Hemoglobin A1C    Recurrent UTI     N39.0    Relevant Orders    Referral to Urology    CBC    Comprehensive Metabolic Panel    Lipid Panel    Hemoglobin A1C    POCT UA Automated manually resulted (Completed)    Urine Culture    Dysuria     R30.0    Relevant Orders    POCT UA Automated manually resulted (Completed)    Urine Culture

## 2024-07-24 ENCOUNTER — SPECIALTY PHARMACY (OUTPATIENT)
Dept: PHARMACY | Facility: CLINIC | Age: 34
End: 2024-07-24

## 2024-07-24 LAB
BACTERIA UR CULT: NORMAL
EST. AVERAGE GLUCOSE BLD GHB EST-MCNC: 97 MG/DL
HBA1C MFR BLD: 5 %

## 2024-07-25 PROCEDURE — RXMED WILLOW AMBULATORY MEDICATION CHARGE

## 2024-08-05 ENCOUNTER — PHARMACY VISIT (OUTPATIENT)
Dept: PHARMACY | Facility: CLINIC | Age: 34
End: 2024-08-05
Payer: MEDICAID

## 2024-08-19 ENCOUNTER — HOSPITAL ENCOUNTER (EMERGENCY)
Facility: HOSPITAL | Age: 34
Discharge: HOME | End: 2024-08-19
Attending: EMERGENCY MEDICINE
Payer: COMMERCIAL

## 2024-08-19 VITALS
WEIGHT: 180 LBS | BODY MASS INDEX: 25.2 KG/M2 | OXYGEN SATURATION: 100 % | HEIGHT: 71 IN | RESPIRATION RATE: 18 BRPM | SYSTOLIC BLOOD PRESSURE: 122 MMHG | DIASTOLIC BLOOD PRESSURE: 65 MMHG | TEMPERATURE: 98.8 F | HEART RATE: 68 BPM

## 2024-08-19 DIAGNOSIS — M25.561 ACUTE PAIN OF RIGHT KNEE: Primary | ICD-10-CM

## 2024-08-19 DIAGNOSIS — L03.116 CELLULITIS OF LEFT LOWER EXTREMITY: ICD-10-CM

## 2024-08-19 PROCEDURE — 99283 EMERGENCY DEPT VISIT LOW MDM: CPT

## 2024-08-19 RX ORDER — DOXYCYCLINE 100 MG/1
100 CAPSULE ORAL 2 TIMES DAILY
Qty: 20 CAPSULE | Refills: 0 | Status: SHIPPED | OUTPATIENT
Start: 2024-08-19 | End: 2024-08-29

## 2024-08-19 RX ORDER — PREDNISONE 20 MG/1
40 TABLET ORAL DAILY
Qty: 10 TABLET | Refills: 0 | Status: SHIPPED | OUTPATIENT
Start: 2024-08-19 | End: 2024-08-24

## 2024-08-19 ASSESSMENT — PAIN - FUNCTIONAL ASSESSMENT: PAIN_FUNCTIONAL_ASSESSMENT: 0-10

## 2024-08-19 ASSESSMENT — COLUMBIA-SUICIDE SEVERITY RATING SCALE - C-SSRS
2. HAVE YOU ACTUALLY HAD ANY THOUGHTS OF KILLING YOURSELF?: NO
1. IN THE PAST MONTH, HAVE YOU WISHED YOU WERE DEAD OR WISHED YOU COULD GO TO SLEEP AND NOT WAKE UP?: NO
6. HAVE YOU EVER DONE ANYTHING, STARTED TO DO ANYTHING, OR PREPARED TO DO ANYTHING TO END YOUR LIFE?: NO

## 2024-08-19 ASSESSMENT — PAIN DESCRIPTION - LOCATION: LOCATION: KNEE

## 2024-08-19 ASSESSMENT — PAIN SCALES - GENERAL: PAINLEVEL_OUTOF10: 5 - MODERATE PAIN

## 2024-08-19 ASSESSMENT — PAIN DESCRIPTION - ORIENTATION: ORIENTATION: LEFT

## 2024-08-19 ASSESSMENT — PAIN DESCRIPTION - PAIN TYPE: TYPE: ACUTE PAIN

## 2024-08-19 ASSESSMENT — PAIN DESCRIPTION - DESCRIPTORS: DESCRIPTORS: THROBBING;BURNING

## 2024-08-20 NOTE — PROGRESS NOTES
The patient was seen by the midlevel/resident.  I have personally saw the patient and made/approved the management plan and take responsibility for the patient management.  I reviewed the EKG's (when done) and agree with the interpretation.  I have seen and examined the patient; agree with the workup, evaluation, MDM, and diagnosis.  The care plan has been discussed with the midlevel/resident; I have reviewed the note and agree with the documented findings.     Presents with complaints of discomfort behind her right knee.  She has some redness that started small and some spreading she is concerned that she has an infection.  Unsure if something bit her or not.  She does have a erythematous irritated area that might be an early cellulitis.  She will be covered with steroids and antibiotics.  No sign of blood clot.  She denies possibility pregnancy.  She be treated with prednisone and Doxy.  Spoke to the patient and her partner in the room.  Diagnoses as of 08/19/24 [id number]   Acute pain of right knee   Cellulitis of left lower extremity     Edilson Matias MD

## 2024-08-20 NOTE — ED PROVIDER NOTES
CC: Knee Pain and Joint Swelling (Pt has redness and swelling to the back of the left knee, increased pain and had a fever this morning, pt denies a bug bite to the area, but states the swelling and pain is increasing )     HPI:   Patient is a 34-year-old female who is otherwise healthy presenting due to concern for infection behind her left knee that started today.  Patient feels like she has an insect bite but does not recall being bitten.  She woke up with this pain.  She reports erythema, warmth and feels like it swollen.  She denies any history of DVTs or PEs and is not on any blood thinners.  Patient feels like the area is starting to get indurated and bumpy.  It is increased in the area of erythema and feels like it is spreading.  She has good pulses in her DP and PTs bilaterally, intact sensation is still able to ambulate despite the pain.  Patient does not have any asymmetric swelling in her lower extremities but does report low-grade fevers with a Tmax of 99.8 at home.  She has been taking Tylenol Motrin, Benadryl and using topical hydrocortisone and triamcinolone without significant relief of symptoms.  She is worried about infection at this time.    Limitations to History: none  Additional History Obtained from: family    PMHx/PSHx:  Per HPI.   - has a past medical history of Abnormal levels of other serum enzymes (05/11/2017), Acute maxillary sinusitis, unspecified (11/10/2021), Acute upper respiratory infection, unspecified (11/11/2021), Chest pain on breathing (09/11/2017), Contact with and (suspected) exposure to covid-19 (10/06/2020), Contact with and (suspected) exposure to covid-19 (11/11/2021), Contusion of hand (07/12/2024), COVID-19 (11/13/2021), Diarrhea (08/08/2023), Encounter for fertility testing (10/16/2014), Encounter for other procreative management (10/13/2014), Encounter for pregnancy test, result unknown (06/29/2015), Fever, unspecified (12/20/2018), Injury of hand (07/12/2024),  Irregular menstruation, unspecified (06/29/2015), Left lower quadrant pain (02/01/2019), Long term (current) use of antibiotics (02/19/2018), Muscle weakness (generalized) (09/27/2017), Other abnormal findings in urine (05/11/2017), Other conditions influencing health status, Other conditions influencing health status (11/13/2021), Other malaise (05/11/2017), Other specified postprocedural states (12/09/2014), Pain in left shoulder (05/09/2016), Pain in right wrist (06/05/2017), Pain in thoracic spine (04/12/2017), Papillomavirus as the cause of diseases classified elsewhere, Pelvic and perineal pain (12/05/2019), Personal history of diseases of the blood and blood-forming organs and certain disorders involving the immune mechanism (05/11/2017), Personal history of other diseases of the digestive system (12/20/2018), Personal history of other diseases of the female genital tract (04/22/2015), Personal history of other diseases of the female genital tract (03/28/2014), Personal history of other diseases of the female genital tract (12/03/2019), Personal history of other diseases of the musculoskeletal system and connective tissue (02/01/2019), Personal history of other diseases of the musculoskeletal system and connective tissue (05/11/2017), Personal history of other diseases of urinary system, Personal history of other infectious and parasitic diseases (05/15/2017), Personal history of other medical treatment, Personal history of other specified conditions (03/19/2014), Personal history of other specified conditions (05/11/2017), Personal history of other specified conditions (09/15/2017), Personal history of other specified conditions (09/08/2020), Personal history of other specified conditions (06/20/2019), Personal history of other specified conditions, Personal history of other specified conditions (09/15/2017), Personal history of other specified conditions (02/28/2020), Personal history of other specified  conditions (02/28/2020), Polydipsia (05/11/2017), Proteinuria, unspecified (05/11/2017), Radiculopathy, cervical region (05/16/2016), Right shoulder pain (07/12/2024), Right upper quadrant pain (10/13/2021), Unspecified acute conjunctivitis, right eye (03/30/2019), Unspecified blepharitis right upper eyelid (12/22/2021), Unspecified disorder of nose and nasal sinuses, Unspecified ovarian cyst, unspecified side (02/01/2019), Urinary tract infection, site not specified (09/17/2021), and Viral conjunctivitis, unspecified (11/07/2019).  - has a past surgical history that includes Knee surgery (08/29/2013); Other surgical history (12/09/2014); Appendectomy (04/01/2014); MR angio head wo IV contrast (1/31/2012); and MR angio neck wo IV contrast (1/31/2012).    Social History:  - Tobacco:  reports that she has never smoked. She has never used smokeless tobacco.   - Alcohol:  reports current alcohol use.   - Drugs:  reports no history of drug use.     Medications: Reviewed in EMR.     Allergies:  Patient has no known allergies.    ???????????????????????????????????????????????????????????????  Triage Vitals:  T 37.1 °C (98.8 °F)  HR 88  /77  RR 18  O2 100 % None (Room air)    Physical Exam  Vitals and nursing note reviewed.   Constitutional:       General: She is not in acute distress.     Appearance: She is well-developed.   HENT:      Head: Normocephalic and atraumatic.      Mouth/Throat:      Mouth: Mucous membranes are dry.      Pharynx: Oropharynx is clear.   Eyes:      Conjunctiva/sclera: Conjunctivae normal.   Cardiovascular:      Rate and Rhythm: Normal rate and regular rhythm.      Heart sounds: No murmur heard.  Pulmonary:      Effort: Pulmonary effort is normal. No respiratory distress.      Breath sounds: Normal breath sounds. No wheezing, rhonchi or rales.   Abdominal:      General: There is no distension.      Palpations: Abdomen is soft.      Tenderness: There is no abdominal tenderness. There is  no guarding or rebound.   Musculoskeletal:         General: Swelling and tenderness present.      Cervical back: Neck supple.   Skin:     General: Skin is warm and dry.      Capillary Refill: Capillary refill takes less than 2 seconds.   Neurological:      Mental Status: She is alert and oriented to person, place, and time.      Sensory: No sensory deficit.      Motor: No weakness.      Gait: Gait normal.   Psychiatric:         Mood and Affect: Mood normal.       ???????????????????????????????????????????????????????????????  EKG (per my interpretation):  not obtained    ED Course       Medical Decision Making:  Patient is a 34-year-old female was previously healthy presenting due to concerns of infection pain in her left knee.  Differentials considered but not limited to Lyme disease, cellulitis, insect bite, allergic reaction, DVT.  Based on patient's history and physical examination my concern at this time for any acute ongoing process is low.  Area was marked but likely will get more swollen given increased amount of inflammatory markers to the site of irritation.  Patient will be given a short course of doxycycline and prednisone to help with pain and swelling as well as cover her for any signs of infection.  Patient will comfortable this plan and was discharged in hemodynamically stable condition.  Patient care was overseen by attending physician who agrees with the plan and disposition.    External records reviewed: recent inpatient, clinic, and prior ED notes  Diagnostic imaging independently reviewed/interpreted by me (as reflected in MDM) includes: none  Social Determinants Affecting Care: None identified  Discussion of management with other providers: attending  Prescription Drug Consideration: doxycycline, prednisone  Escalation of Care: none    Impression:   Cellulitis  Insect bite    Disposition: Discharge      Procedures ? [employer] last updated 8/19/2024 8:37 PM     Arleen Sims  PGY-2  Emergency Medicine  [facility]     Arleen Sims MD  Resident  08/19/24 1910

## 2024-08-28 ENCOUNTER — SPECIALTY PHARMACY (OUTPATIENT)
Dept: PHARMACY | Facility: CLINIC | Age: 34
End: 2024-08-28

## 2024-08-28 DIAGNOSIS — G44.029 CHRONIC CLUSTER HEADACHE, NOT INTRACTABLE: ICD-10-CM

## 2024-08-28 RX ORDER — GALCANEZUMAB 100 MG/ML
INJECTION, SOLUTION SUBCUTANEOUS
Qty: 3 ML | Refills: 6 | OUTPATIENT
Start: 2024-08-28 | End: 2025-08-28

## 2024-09-03 ENCOUNTER — LAB REQUISITION (OUTPATIENT)
Dept: LAB | Facility: HOSPITAL | Age: 34
End: 2024-09-03
Payer: COMMERCIAL

## 2024-09-03 ENCOUNTER — HOSPITAL ENCOUNTER (OUTPATIENT)
Dept: RADIOLOGY | Facility: HOSPITAL | Age: 34
Discharge: HOME | End: 2024-09-03
Payer: COMMERCIAL

## 2024-09-03 DIAGNOSIS — N20.0 CALCULUS OF KIDNEY: ICD-10-CM

## 2024-09-03 DIAGNOSIS — N39.0 URINARY TRACT INFECTION, SITE NOT SPECIFIED: ICD-10-CM

## 2024-09-03 PROCEDURE — 87086 URINE CULTURE/COLONY COUNT: CPT

## 2024-09-03 PROCEDURE — 74018 RADEX ABDOMEN 1 VIEW: CPT

## 2024-09-03 PROCEDURE — 74018 RADEX ABDOMEN 1 VIEW: CPT | Performed by: RADIOLOGY

## 2024-09-04 ENCOUNTER — SPECIALTY PHARMACY (OUTPATIENT)
Dept: PHARMACY | Facility: CLINIC | Age: 34
End: 2024-09-04

## 2024-09-05 LAB — BACTERIA UR CULT: NORMAL

## 2024-09-18 ENCOUNTER — APPOINTMENT (OUTPATIENT)
Dept: NEUROLOGY | Facility: CLINIC | Age: 34
End: 2024-09-18
Payer: COMMERCIAL

## 2024-09-18 VITALS — SYSTOLIC BLOOD PRESSURE: 112 MMHG | TEMPERATURE: 97.8 F | HEART RATE: 68 BPM | DIASTOLIC BLOOD PRESSURE: 62 MMHG

## 2024-09-18 DIAGNOSIS — G44.029 CHRONIC CLUSTER HEADACHE, NOT INTRACTABLE: ICD-10-CM

## 2024-09-18 DIAGNOSIS — E55.9 VITAMIN D3 DEFICIENCY: ICD-10-CM

## 2024-09-18 DIAGNOSIS — G43.009 MIGRAINE WITHOUT AURA AND WITHOUT STATUS MIGRAINOSUS, NOT INTRACTABLE: Primary | ICD-10-CM

## 2024-09-18 DIAGNOSIS — G91.9 HYDROCEPHALUS, ADULT (MULTI): ICD-10-CM

## 2024-09-18 PROCEDURE — 99214 OFFICE O/P EST MOD 30 MIN: CPT

## 2024-09-18 PROCEDURE — RXMED WILLOW AMBULATORY MEDICATION CHARGE

## 2024-09-18 RX ORDER — GALCANEZUMAB 100 MG/ML
300 INJECTION, SOLUTION SUBCUTANEOUS
Qty: 3 EACH | Refills: 3 | Status: SHIPPED | OUTPATIENT
Start: 2024-09-18

## 2024-09-18 ASSESSMENT — PAIN SCALES - GENERAL: PAINLEVEL: 0-NO PAIN

## 2024-09-18 NOTE — PROGRESS NOTES
Chief Complaint   Patient presents with    Follow-up    Migraine     Last visit 3/7/23. Emgality is working really well. About a week towards the end of the month she can feel it starting to wear off. Migraines come back at this point. They are almost daily at this point when emgality is wearing off. Pain scale 4-5/10 at end of month. Will try tylenol first but if that does not help she will take a Amitriptyline. First dose usually takes the migraine away.     Subjective   HPI  Alexandria Dinero is a 34 y.o. year old female who presents with chief complaint Migraine without aura and without status migrainosus, not intractable [G43.009] and Chronic Cluster HA. She has a PMH of prolonged QT syndrome and hydrocephalus as an adult.     Alexandria is having headaches (migraine and cluster) occur before the next dose of Emgality is due.   Recently, had to use the sumatriptan for a migraine. Worked within 45 minutes. She states the sumatriptan does make her sleepy. She usually takes in the evening/ wakes the next day with no headache.   Headaches occurring 1-2 times per week (during overlap period from the 21 st day after the Emgality injection until the next dose has been administered) 4 per month.  After Emgality (cluster dosing), she sees improvement and notices a decrease in her headache frequency. She has no headaches during the first 20 days after using the Emgality.   Unsure of triggers to headaches.   No Aura  No recent vision changes. Regular vision checkups, last 9/17/24.     Medications  Preventive:  Flexeril  Currently using Emgality Cluster Dosing  Has tried Topamax  Ajovy    Rescue:  Sumatriptan    Current Outpatient Medications:     amitriptyline (Elavil) 10 mg tablet, Take 1 tablet (10 mg) by mouth as needed at bedtime., Disp: , Rfl:     Bion Tears, PF, 0.1-0.3 % ophthalmic solution, INSTILL 1 DROP IN BOTH EYES THREE TIMES DAILY to FOUR TIMES DAILY, Disp: , Rfl:     galcanezumab (Emgality Syringe) 300 mg/3 mL  (100 mg/mL x 3) prefilled syringe, INJECT 300MG (3 SYRINGES) UNDER THE SKIN ONCE A MONTH AS NEEDED., Disp: 3 mL, Rfl: 6    ibuprofen 800 mg tablet, Ibuprofen 800 MG Oral Tablet Quantity: 16  Refills: 0  Start: 9-Dec-2022, Disp: , Rfl:     Sheri 1/20, 21, 1-20 mg-mcg tablet, Take 1 tablet by mouth early in the morning.., Disp: , Rfl:     lidocaine (Lidoderm) 5 % patch, apply 1 (ONE) patch to affected area once daily, Disp: , Rfl:     metoprolol succinate XL (Toprol-XL) 25 mg 24 hr tablet, Metoprolol Succinate ER 25 MG Oral Tablet Extended Release 24 Hour Refills: 0, Disp: , Rfl:     ondansetron ODT (Zofran-ODT) 4 mg disintegrating tablet, Take 1 tablet (4 mg) by mouth every 8 hours if needed., Disp: , Rfl:     SUMAtriptan (Imitrex) 100 mg tablet, Take 1 tablet (100 mg) by mouth 1 time if needed for migraine (may repeat x1)., Disp: 9 tablet, Rfl: 6    SUMAtriptan (Imitrex) 50 mg tablet, TAKE 1 TABLET FOR MIGRAINE RELIEF. MAY REPEAT EVERY 2 HOURS. MAX 200MG/DAY., Disp: , Rfl:     SUMAtriptan succinate 4 mg/0.5 mL pen injector, Take one injection at onset of migraine.  May repeat in 15 minutes times 2. No more than 3 doses in 24 hour period, Disp: , Rfl:     cyclobenzaprine (Flexeril) 10 mg tablet, TAKE 1 TABLET Bedtime PRN, Disp: , Rfl:     Past Medical History:   Diagnosis Date    Abnormal levels of other serum enzymes 05/11/2017    Elevated CPK    Acute maxillary sinusitis, unspecified 11/10/2021    Acute maxillary sinusitis    Acute upper respiratory infection, unspecified 11/11/2021    Viral URI with cough    Chest pain on breathing 09/11/2017    Chest pain on respiration    Contact with and (suspected) exposure to covid-19 10/06/2020    Suspected COVID-19 virus infection    Contact with and (suspected) exposure to covid-19 11/11/2021    Suspected COVID-19 virus infection    Contusion of hand 07/12/2024    COVID-19 11/13/2021    COVID-19    Diarrhea 08/08/2023    Encounter for fertility testing 10/16/2014     Fertility testing    Encounter for other procreative management 10/13/2014    Encounter for fertility planning    Encounter for pregnancy test, result unknown 06/29/2015    Encounter for confirmation of pregnancy test result with physical examination    Fever, unspecified 12/20/2018    Fever and chills    Injury of hand 07/12/2024    Irregular menstruation, unspecified 06/29/2015    Missed period    Left lower quadrant pain 02/01/2019    Abdominal pain, LLQ (left lower quadrant)    Long term (current) use of antibiotics 02/19/2018    Prophylactic antibiotic    Muscle weakness (generalized) 09/27/2017    Weakness of trunk musculature    Other abnormal findings in urine 05/11/2017    Urine leukocytes    Other conditions influencing health status     Menstruation    Other conditions influencing health status 11/13/2021    History of cough    Other malaise 05/11/2017    Malaise and fatigue    Other specified postprocedural states 12/09/2014    Post-operative state    Pain in left shoulder 05/09/2016    Acute pain of left shoulder    Pain in right wrist 06/05/2017    Wrist pain, acute, right    Pain in thoracic spine 04/12/2017    Thoracic back pain    Papillomavirus as the cause of diseases classified elsewhere     HPV in female    Pelvic and perineal pain 12/05/2019    Pelvic pain in female    Personal history of diseases of the blood and blood-forming organs and certain disorders involving the immune mechanism 05/11/2017    History of thrombocytopenia    Personal history of other diseases of the digestive system 12/20/2018    History of gastroenteritis    Personal history of other diseases of the female genital tract 04/22/2015    History of female infertility    Personal history of other diseases of the female genital tract 03/28/2014    History of breast pain    Personal history of other diseases of the female genital tract 12/03/2019    History of vaginitis    Personal history of other diseases of the  musculoskeletal system and connective tissue 02/01/2019    History of back pain    Personal history of other diseases of the musculoskeletal system and connective tissue 05/11/2017    History of muscle pain    Personal history of other diseases of urinary system     H/O glomerulonephritis    Personal history of other infectious and parasitic diseases 05/15/2017    History of cytomegalovirus infection    Personal history of other medical treatment     History of mammogram    Personal history of other specified conditions 03/19/2014    History of flank pain    Personal history of other specified conditions 05/11/2017    History of polyuria    Personal history of other specified conditions 09/15/2017    History of dyspnea    Personal history of other specified conditions 09/08/2020    History of palpitations    Personal history of other specified conditions 06/20/2019    History of headache    Personal history of other specified conditions     History of abnormal Pap smear    Personal history of other specified conditions 09/15/2017    History of dizziness    Personal history of other specified conditions 02/28/2020    History of urinary frequency    Personal history of other specified conditions 02/28/2020    History of fatigue    Polydipsia 05/11/2017    Polydipsia    Proteinuria, unspecified 05/11/2017    Proteinuria    Radiculopathy, cervical region 05/16/2016    Acute cervical radiculopathy    Right shoulder pain 07/12/2024    Right upper quadrant pain 10/13/2021    Abdominal pain, RUQ (right upper quadrant)    Unspecified acute conjunctivitis, right eye 03/30/2019    Acute bacterial conjunctivitis of right eye    Unspecified blepharitis right upper eyelid 12/22/2021    Blepharitis of right upper eyelid, unspecified type    Unspecified disorder of nose and nasal sinuses     Sinus problem    Unspecified ovarian cyst, unspecified side 02/01/2019    Benign ovarian cyst    Urinary tract infection, site not specified  09/17/2021    Acute lower UTI    Viral conjunctivitis, unspecified 11/07/2019    Acute viral conjunctivitis of right eye     Past Surgical History:   Procedure Laterality Date    APPENDECTOMY  04/01/2014    Appendectomy    KNEE SURGERY  08/29/2013    Knee Surgery    MR HEAD ANGIO WO IV CONTRAST  1/31/2012    MR HEAD ANGIO WO IV CONTRAST 1/31/2012 [facility]    MR NECK ANGIO WO IV CONTRAST  1/31/2012    MR NECK ANGIO WO IV CONTRAST 1/31/2012 [facility]    OTHER SURGICAL HISTORY  12/09/2014    Hysteroscopy With Resection For Intrauterine Polyp Removal     Family History   Problem Relation Name Age of Onset    Crohn's disease Father      Crohn's disease Brother       Social History     Tobacco Use    Smoking status: Never    Smokeless tobacco: Never   Substance Use Topics    Alcohol use: Yes     Comment: occasionally     Social History     Substance and Sexual Activity   Drug Use Never      ROS  As noted in HPI, otherwise all other systems have been reviewed are negative for complaint.     Objective   General Appearance: Alexandria is well-developed, well-nourished, 34 y.o. year old female, in no acute distress. Makes good eye contact, is alert, interactive, and cooperative. Demonstrates recent & remote memory recall. Subjective information consistent with objective assessment.     Vitals:    09/18/24 0958   BP: 112/62   Pulse: 68   Temp: 36.6 °C (97.8 °F)   PainSc: 0-No pain       Lab Results   Component Value Date    WBC 8.8 07/23/2024    RBC 4.18 07/23/2024    HGB 13.6 07/23/2024    HCT 40.1 07/23/2024     07/23/2024     07/23/2024    K 4.5 07/23/2024     07/23/2024    BUN 13 07/23/2024    CREATININE 0.74 07/23/2024    EGFR >90 07/23/2024    CALCIUM 9.3 07/23/2024    ALKPHOS 43 07/23/2024    AST 16 07/23/2024    ALT 10 07/23/2024    MG 1.98 03/20/2021    VITD25 24 (A) 10/23/2019    HGBA1C 5.0 07/23/2024    LDLCALC 90 07/23/2024    CHOL 162 07/23/2024    HDL 59.8 07/23/2024    TRIG  "60 07/23/2024    TSH 1.48 01/23/2020      Records Review:  Visit note 2/8/23 Dr Galicia  Hydrocephalus, adult   Neurosurgery Referral Evaluation and Treatment  Evaluate & Treat patient with non  positional migrane/cluster with compensated ? hydrocephalus  Status: Hold For -  Scheduling  Requested for: [id number]   Assessed    · Hydrocephalus, adult (331.4) (G91.9)   · Migraine, unspecified, not intractable, without status migrainosus (346.90) (G43.909)   · Prolonged QT syndrome (426.82) (I45.81)   · Cluster headache (339.00) (G44.009)    Last head CT 1/13/2023  FINDINGS:  There is ventriculomegaly which is unchanged. Gray-white  differentiation is maintained. Ventricles are unchanged in size and  position. There is no mass effect or midline shift. No acute  intracranial hemorrhage is identified. No extra-axial fluid  collections are seen. No intraparenchymal mass lesions are  identified.  Bone windows demonstrate no evidence of an acute  calvarial fracture.  IMPRESSION:  No evidence of an acute intracranial process. Stable ventriculomegaly.  Assessment & Plan  Migraine without aura and without status migrainosus, not intractable  Bridge for breakthrough headaches between wear-off of Emgality (Cluster HA dose). May use Nurtec PRN every other day from day 21 until subsequent Emgality dose is effective. Sample provided to patient today.  Orders:    rimegepant (Nurtec ODT) 75 mg tablet,disintegrating; Take 1 tablet (75 mg) by mouth once daily as needed (for acute migraine, no more than 1 dose in 24 hours).    Hydrocephalus, adult (Multi)   Neurosurgery Referral Evaluation and Treatment  2/8/2023 appointment not made. Will place new referral order today.   Orders:    Referral to Neurosurgery; Future    Chronic cluster headache, not intractable  Experiencing wear-off from day 21 until the next injection. Advised patient to inject every 28 days rather than \"once a month.\"  Patient was due 9/8/24 for Emgality. Sample provided " to patient today.  Orders:    galcanezumab (Emgality Syringe) 300 mg/3 mL (100 mg/mL x 3) prefilled syringe; Inject 3 Syringes (300 mg) under the skin every 28 (twenty-eight) days.    Vitamin D3 deficiency    Orders:    Vitamin D 1,25 Dihydroxy (for eval of hypercalcemia); Future       Follow up with neurosurgery for hydrocephalus. Obtain ordered labs, follow up in 6 months, sooner if treatment change ineffective.   Madison Stoll, APRN-CNP

## 2024-09-18 NOTE — PATIENT INSTRUCTIONS
Dear Alexandria,    Thank you for coming to [facility]. It was a pleasure caring for you today.  The best way to contact me for medication refills or questions about your care is through [employer].  Otherwise, you can contact the office by phone: [phone].    Madison Stoll, APRN-CNP    Migraines:    Suggestions for preventing or controlling migraines:  Riboflavin (vitamin B2) 200 mg twice a day may be helpful. Side effects can include diarrhea, increased urination and bright yellow urine. This should not be taken by kids.   CoQ10 100 mg daily up to three times per day may also be helpful. Side effects can include nausea, diarrhea, and dyspepsia.   Magnesium (oxelate) 400- 600 mg daily for prevention. Magnesium can also help with menstrual migraines. Side effects can include diarrhea and flushing.   According to the American Academy of Neurology, there is not sufficient evidence that melatonin helps prevent or treat migraines, so it is not currently recommended for this use. Butterbur is also not currently recommended for migraine prevention as it can cause liver toxicity.   Avoid triggers that can cause or worsen migraines (food, lack of sleep, stress, etc.)  Headache frequency is noted to be increased in those with low physical activity, poor sleep, high BMI, smoking, and caffeine overuse. Managing stress with relaxation techniques and getting 20-30 minutes of aerobic exercise at least 4 times per week can help decrease headache frequency and intensity.   Keep a diary of your headaches to note triggers, how often you get them, how long they last, associated symptoms, what medicines you took and if they helped, and any other helpful information   Avoid taking rescue medication (NOT preventative medication) more than 3 days a week (includes both prescription and over the counter meds)  Take your preventative medication as directed. Let me know if you have side effects or problems with  "the medication. Do not suddenly stop the medication.       Take the Nurtec as needed to bridge you in between Emgality doses.     CGRP (calcitonin gene-related peptide) is a molecule in your brain that appears to be related to neurogenic inflammation and pain transmission in people who suffer from migraines.  There are 2 oral anti-CGRP medications available for acute migraine treatment: Nurtec and Ubrelvy. These are small molecule CRGP antagonists.           Nurtec is taken orally or disintegrated in mouth, 75 mg per dose, no more than one dose in 24 hours. Side effects are rare but may cause nausea.           You can visit [email or web address] to sign up for a Nurtec ODT Copay Card. This can be downloaded to your phone, or printed. Please then contact your pharmacy to give them your savings card information and have it applied to your prescription.   If your prescription was sent to  [facility], you can contact them at [phone] to have the savings card applied to your prescription.    *Patients are not eligible to use this card if they are enrolled in a state or federally funded insurance program, including but not limited to Medicare, Medicaid, , [employer] health care, a state prescription drug assistance program, or the Government Health Insurance Plan available in [place] (formerly known as \"[place]").        [facility] [phone]   "

## 2024-09-18 NOTE — ASSESSMENT & PLAN NOTE
Bridge for breakthrough headaches between wear-off of Emgality (Cluster HA dose). May use Nurtec PRN every other day from day 21 until subsequent Emgality dose is effective. Sample provided to patient today.  Orders:    rimegepant (Nurtec ODT) 75 mg tablet,disintegrating; Take 1 tablet (75 mg) by mouth once daily as needed (for acute migraine, no more than 1 dose in 24 hours).

## 2024-09-18 NOTE — ASSESSMENT & PLAN NOTE
Neurosurgery Referral Evaluation and Treatment  2/8/2023 appointment not made. Will place new referral order today.   Orders:    Referral to Neurosurgery; Future

## 2024-09-24 ENCOUNTER — PHARMACY VISIT (OUTPATIENT)
Dept: PHARMACY | Facility: CLINIC | Age: 34
End: 2024-09-24
Payer: MEDICAID

## 2024-10-14 ENCOUNTER — LAB (OUTPATIENT)
Dept: LAB | Facility: LAB | Age: 34
End: 2024-10-14
Payer: COMMERCIAL

## 2024-10-14 DIAGNOSIS — E55.9 VITAMIN D3 DEFICIENCY: ICD-10-CM

## 2024-10-14 PROCEDURE — 36415 COLL VENOUS BLD VENIPUNCTURE: CPT

## 2024-10-14 PROCEDURE — 82652 VIT D 1 25-DIHYDROXY: CPT

## 2024-10-16 LAB — 1,25(OH)2D SERPL-MCNC: 63.4 PG/ML (ref 19.9–79.3)

## 2024-10-17 ENCOUNTER — SPECIALTY PHARMACY (OUTPATIENT)
Dept: PHARMACY | Facility: CLINIC | Age: 34
End: 2024-10-17

## 2024-10-21 ENCOUNTER — SPECIALTY PHARMACY (OUTPATIENT)
Dept: PHARMACY | Facility: CLINIC | Age: 34
End: 2024-10-21

## 2024-10-21 PROCEDURE — RXMED WILLOW AMBULATORY MEDICATION CHARGE

## 2024-10-22 ENCOUNTER — PHARMACY VISIT (OUTPATIENT)
Dept: PHARMACY | Facility: CLINIC | Age: 34
End: 2024-10-22
Payer: MEDICAID

## 2024-11-16 PROCEDURE — RXMED WILLOW AMBULATORY MEDICATION CHARGE

## 2024-11-20 ENCOUNTER — PHARMACY VISIT (OUTPATIENT)
Dept: PHARMACY | Facility: CLINIC | Age: 34
End: 2024-11-20
Payer: MEDICAID

## 2024-11-26 ENCOUNTER — APPOINTMENT (OUTPATIENT)
Dept: NEUROSURGERY | Facility: CLINIC | Age: 34
End: 2024-11-26
Payer: COMMERCIAL

## 2024-11-26 VITALS
DIASTOLIC BLOOD PRESSURE: 64 MMHG | SYSTOLIC BLOOD PRESSURE: 112 MMHG | HEIGHT: 71 IN | WEIGHT: 193.34 LBS | BODY MASS INDEX: 27.07 KG/M2 | HEART RATE: 68 BPM | RESPIRATION RATE: 20 BRPM

## 2024-11-26 DIAGNOSIS — G91.9 HYDROCEPHALUS, ADULT (MULTI): ICD-10-CM

## 2024-11-26 PROCEDURE — 99202 OFFICE O/P NEW SF 15 MIN: CPT | Performed by: NEUROLOGICAL SURGERY

## 2024-11-26 PROCEDURE — 3008F BODY MASS INDEX DOCD: CPT | Performed by: NEUROLOGICAL SURGERY

## 2024-11-26 PROCEDURE — 1036F TOBACCO NON-USER: CPT | Performed by: NEUROLOGICAL SURGERY

## 2024-11-26 ASSESSMENT — ENCOUNTER SYMPTOMS
LOSS OF SENSATION IN FEET: 0
PSYCHIATRIC NEGATIVE: 1
MUSCULOSKELETAL NEGATIVE: 1
OCCASIONAL FEELINGS OF UNSTEADINESS: 0
CARDIOVASCULAR NEGATIVE: 1
HEMATOLOGIC/LYMPHATIC NEGATIVE: 1
FATIGUE: 1
HEADACHES: 1
ENDOCRINE NEGATIVE: 1
CONSTIPATION: 1
EYES NEGATIVE: 1
ALLERGIC/IMMUNOLOGIC NEGATIVE: 1
DEPRESSION: 0
RESPIRATORY NEGATIVE: 1
ABDOMINAL PAIN: 1

## 2024-11-26 ASSESSMENT — PAIN SCALES - GENERAL: PAINLEVEL_OUTOF10: 4

## 2024-11-26 NOTE — PROGRESS NOTES
"Here today with migraines for a long time. Sees Dr. Galicia for hydrocephalus. Having headache 2 times a week. This has been going on for 10 years.    For evaluations of headaches and ventriculomegaly.  The patient has had headaches for roughly 10 years.  These significantly improved with migraine medication.  Recently though they have been coming back.  She describes it as a bitemporal headache that is independent of position.  She sometimes feels nauseous when she gets headaches.  She denies any sensory symptoms or visual changes.    Review of Systems   Constitutional:  Positive for fatigue.   HENT: Negative.     Eyes: Negative.    Respiratory: Negative.     Cardiovascular: Negative.    Gastrointestinal:  Positive for abdominal pain and constipation.   Endocrine: Negative.    Genitourinary: Negative.    Musculoskeletal: Negative.    Skin: Negative.    Allergic/Immunologic: Negative.    Neurological:  Positive for headaches.   Hematological: Negative.    Psychiatric/Behavioral: Negative.         Visit Vitals  /64 (BP Location: Left arm, Patient Position: Sitting, BP Cuff Size: Adult)   Pulse 68   Resp 20   Ht 1.803 m (5' 11\")   Wt 87.7 kg (193 lb 5.5 oz)   BMI 26.97 kg/m²   OB Status Having periods   Smoking Status Never   BSA 2.1 m²           Current Outpatient Medications:     Bion Tears, PF, 0.1-0.3 % ophthalmic solution, INSTILL 1 DROP IN BOTH EYES THREE TIMES DAILY to FOUR TIMES DAILY, Disp: , Rfl:     galcanezumab (Emgality Syringe) 300 mg/3 mL (100 mg/mL x 3) prefilled syringe, Inject 3 Syringes (300 mg) under the skin every 28 (twenty-eight) days., Disp: 3 each, Rfl: 3    ibuprofen 800 mg tablet, Ibuprofen 800 MG Oral Tablet Quantity: 16  Refills: 0  Start: 9-Dec-2022, Disp: , Rfl:     Sheri 1/20, 21, 1-20 mg-mcg tablet, Take 1 tablet by mouth early in the morning.., Disp: , Rfl:     rimegepant (Nurtec ODT) 75 mg tablet,disintegrating, Take 1 tablet (75 mg) by mouth once daily as needed for acute " migraine. No more than 1 dose in 24 hours., Disp: 16 tablet, Rfl: 11    SUMAtriptan (Imitrex) 50 mg tablet, TAKE 1 TABLET FOR MIGRAINE RELIEF. MAY REPEAT EVERY 2 HOURS. MAX 200MG/DAY., Disp: , Rfl:     amitriptyline (Elavil) 10 mg tablet, Take 1 tablet (10 mg) by mouth as needed at bedtime., Disp: , Rfl:     cyclobenzaprine (Flexeril) 10 mg tablet, TAKE 1 TABLET Bedtime PRN (Patient not taking: Reported on 11/26/2024), Disp: , Rfl:     lidocaine (Lidoderm) 5 % patch, apply 1 (ONE) patch to affected area once daily (Patient not taking: Reported on 11/26/2024), Disp: , Rfl:     metoprolol succinate XL (Toprol-XL) 25 mg 24 hr tablet, Metoprolol Succinate ER 25 MG Oral Tablet Extended Release 24 Hour Refills: 0 (Patient not taking: Reported on 11/26/2024), Disp: , Rfl:     ondansetron ODT (Zofran-ODT) 4 mg disintegrating tablet, Take 1 tablet (4 mg) by mouth every 8 hours if needed. (Patient not taking: Reported on 11/26/2024), Disp: , Rfl:     SUMAtriptan (Imitrex) 100 mg tablet, Take 1 tablet (100 mg) by mouth 1 time if needed for migraine (may repeat x1)., Disp: 9 tablet, Rfl: 6    SUMAtriptan succinate 4 mg/0.5 mL pen injector, Take one injection at onset of migraine.  May repeat in 15 minutes times 2. No more than 3 doses in 24 hour period, Disp: , Rfl:       Objective   Neurological Exam    On physical exam, the patient is alert and interactive.  Extraocular movements are intact.  Face moves symmetrically.  She moves all extremities symmetrically.    CAT scans and MRIs of the head that I personally reviewed demonstrates mild to moderate diffuse ventriculomegaly.  Her last CAT scan was in 2019.  The findings however go back to her first cranial imaging which was an MRI in 2006.  These appear unchanged over the years.    I am concerned about the possibility that the patient has developed symptomatic hydrocephalus.  We will obtain a new CAT scan of the head to evaluate the size and configuration of her ventricles.  I  would also like for her to arrange to have her most recent ophthalmologic exam sent to us to see if there is any evidence of papilledema.  If necessary, she may require a lumbar puncture to measure her opening pressure before determining whether or not she requires any surgical intervention.    ADDENDUM: Optho exam does not have any papilledema.  As such, I have low concern for intracranial hypertension.  Patient may follow up if new issues arise.

## 2024-11-29 ENCOUNTER — HOSPITAL ENCOUNTER (OUTPATIENT)
Dept: RADIOLOGY | Facility: HOSPITAL | Age: 34
Discharge: HOME | End: 2024-11-29
Payer: COMMERCIAL

## 2024-11-29 DIAGNOSIS — G91.9 HYDROCEPHALUS, ADULT (MULTI): ICD-10-CM

## 2024-11-29 PROCEDURE — 70450 CT HEAD/BRAIN W/O DYE: CPT

## 2024-12-05 ENCOUNTER — TELEPHONE (OUTPATIENT)
Dept: NEUROLOGY | Facility: CLINIC | Age: 34
End: 2024-12-05
Payer: COMMERCIAL

## 2024-12-05 NOTE — TELEPHONE ENCOUNTER
Alexandria had a  CT scan of brain with Dr Do. She would like your review re. Changes since last scan. Images are in Epic

## 2024-12-09 ENCOUNTER — SPECIALTY PHARMACY (OUTPATIENT)
Dept: PHARMACY | Facility: CLINIC | Age: 34
End: 2024-12-09

## 2024-12-11 DIAGNOSIS — G43.009 MIGRAINE WITHOUT AURA AND WITHOUT STATUS MIGRAINOSUS, NOT INTRACTABLE: ICD-10-CM

## 2024-12-11 PROCEDURE — RXMED WILLOW AMBULATORY MEDICATION CHARGE

## 2024-12-11 NOTE — TELEPHONE ENCOUNTER
Insurance denied the 16 tabs per 30 days but did approve the 8 tabs per 30 days. Reference Number [id number]. 12/10/24-12/10/25.

## 2024-12-16 ENCOUNTER — PHARMACY VISIT (OUTPATIENT)
Dept: PHARMACY | Facility: CLINIC | Age: 34
End: 2024-12-16
Payer: MEDICARE

## 2025-01-07 DIAGNOSIS — G44.029 CHRONIC CLUSTER HEADACHE, NOT INTRACTABLE: ICD-10-CM

## 2025-01-07 PROCEDURE — RXMED WILLOW AMBULATORY MEDICATION CHARGE

## 2025-01-07 RX ORDER — GALCANEZUMAB 100 MG/ML
300 INJECTION, SOLUTION SUBCUTANEOUS
Qty: 3 EACH | Refills: 3 | Status: SHIPPED | OUTPATIENT
Start: 2025-01-07

## 2025-01-08 ENCOUNTER — PHARMACY VISIT (OUTPATIENT)
Dept: PHARMACY | Facility: CLINIC | Age: 35
End: 2025-01-08
Payer: COMMERCIAL

## 2025-01-08 ENCOUNTER — SPECIALTY PHARMACY (OUTPATIENT)
Dept: PHARMACY | Facility: CLINIC | Age: 35
End: 2025-01-08

## 2025-01-11 ENCOUNTER — SPECIALTY PHARMACY (OUTPATIENT)
Dept: PHARMACY | Facility: CLINIC | Age: 35
End: 2025-01-11

## 2025-02-04 ENCOUNTER — PHARMACY VISIT (OUTPATIENT)
Dept: PHARMACY | Facility: CLINIC | Age: 35
End: 2025-02-04
Payer: COMMERCIAL

## 2025-02-04 PROCEDURE — RXMED WILLOW AMBULATORY MEDICATION CHARGE

## 2025-03-05 ENCOUNTER — SPECIALTY PHARMACY (OUTPATIENT)
Dept: PHARMACY | Facility: CLINIC | Age: 35
End: 2025-03-05

## 2025-03-05 PROCEDURE — RXMED WILLOW AMBULATORY MEDICATION CHARGE

## 2025-03-11 ENCOUNTER — APPOINTMENT (OUTPATIENT)
Dept: NEUROLOGY | Facility: CLINIC | Age: 35
End: 2025-03-11
Payer: COMMERCIAL

## 2025-03-11 DIAGNOSIS — G44.029 CHRONIC CLUSTER HEADACHE, NOT INTRACTABLE: ICD-10-CM

## 2025-03-11 DIAGNOSIS — E55.9 VITAMIN D3 DEFICIENCY: ICD-10-CM

## 2025-03-11 DIAGNOSIS — G43.009 MIGRAINE WITHOUT AURA AND WITHOUT STATUS MIGRAINOSUS, NOT INTRACTABLE: Primary | ICD-10-CM

## 2025-03-11 DIAGNOSIS — G91.9 HYDROCEPHALUS, ADULT (MULTI): ICD-10-CM

## 2025-03-11 PROCEDURE — 99213 OFFICE O/P EST LOW 20 MIN: CPT

## 2025-03-11 RX ORDER — GALCANEZUMAB 100 MG/ML
300 INJECTION, SOLUTION SUBCUTANEOUS
Qty: 3 EACH | Refills: 3 | Status: CANCELLED | OUTPATIENT
Start: 2025-03-11 | End: 2025-06-03

## 2025-03-11 NOTE — PROGRESS NOTES
Chief Complaint   Patient presents with    Migraine    Headache       Virtual or Telephone Consent  An interactive audio and video telecommunication system which permits real time communications between the patient (at the originating site) and provider (at the distant site) was utilized to provide this telehealth service.     Verbal consent was requested and obtained from Alexandria Dinero on this date, 03/11/25 for a telehealth visit and the patient's location was confirmed at the time of the visit.    Subjective   HPI  Alexandria Dinero is a 34 y.o. year old female who presents with chief complaint Migraine without aura and without status migrainosus, not intractable [G43.009], chronic cluster HAs, and vitamin D3 deficiency. PMH of prolonged QT syndrome and hydrocephalus as an adult.     Alexandria states she has only had 1 HA in the last 6 months; feels that the current medication regimen is working very well. Current Emgality/ cluster dosing (300 mg) every 21 days is holding off the HAs. If she notices increase in HAs/ wear-off prior to the next dose, will use Nurtec  as a bridge between injections, and is currently using PRN.  She is unsure of triggers, does not experience an aura.   Using sumatriptan for rescue therapy. Starts to take effect within 30 minutes, effective to take away 90% of HA symptoms and associated symptoms.  The patient denies the presence of any associated double vision, speech problems, confusion, facial or extremity weakness or numbness or problems with coordination. Denies other neurological history of seizures, stroke, or TIA.  No concern with vitamin D level, continue as-is.     Current/ past HA treatments:  Preventive:  Flexeril  Currently using Emgality Cluster Dosing  Has tried Topamax  Ajovy     Rescue:  Sumatriptan  Nurtec PRN as a bridge/PRN    Current Outpatient Medications:     amitriptyline (Elavil) 10 mg tablet, Take 1 tablet (10 mg) by mouth as needed at bedtime., Disp: , Rfl:      Bion Tears, PF, 0.1-0.3 % ophthalmic solution, INSTILL 1 DROP IN BOTH EYES THREE TIMES DAILY to FOUR TIMES DAILY, Disp: , Rfl:     cyclobenzaprine (Flexeril) 10 mg tablet, TAKE 1 TABLET Bedtime PRN (Patient not taking: Reported on 11/26/2024), Disp: , Rfl:     galcanezumab (Emgality Syringe) 300 mg/3 mL (100 mg/mL x 3) prefilled syringe, Inject 3 Syringes (300 mg) under the skin every 28 (twenty-eight) days., Disp: 3 each, Rfl: 3    ibuprofen 800 mg tablet, Ibuprofen 800 MG Oral Tablet Quantity: 16  Refills: 0  Start: 9-Dec-2022, Disp: , Rfl:     Sheri 1/20, 21, 1-20 mg-mcg tablet, Take 1 tablet by mouth early in the morning.., Disp: , Rfl:     lidocaine (Lidoderm) 5 % patch, apply 1 (ONE) patch to affected area once daily (Patient not taking: Reported on 11/26/2024), Disp: , Rfl:     metoprolol succinate XL (Toprol-XL) 25 mg 24 hr tablet, Metoprolol Succinate ER 25 MG Oral Tablet Extended Release 24 Hour Refills: 0 (Patient not taking: Reported on 11/26/2024), Disp: , Rfl:     ondansetron ODT (Zofran-ODT) 4 mg disintegrating tablet, Take 1 tablet (4 mg) by mouth every 8 hours if needed. (Patient not taking: Reported on 11/26/2024), Disp: , Rfl:     rimegepant (Nurtec ODT) 75 mg tablet,disintegrating, Take 1 tablet (75 mg) by mouth once daily as needed for acute migraine. No more than 1 dose in 24 hours., Disp: 8 tablet, Rfl: 11    SUMAtriptan (Imitrex) 100 mg tablet, Take 1 tablet (100 mg) by mouth 1 time if needed for migraine (may repeat x1)., Disp: 9 tablet, Rfl: 6    SUMAtriptan succinate 4 mg/0.5 mL pen injector, Take one injection at onset of migraine.  May repeat in 15 minutes times 2. No more than 3 doses in 24 hour period, Disp: , Rfl:     No Known Allergies    ROS  As noted in HPI, otherwise all other systems have been reviewed are negative for complaint.     Objective   General Appearance: Alexandria is well-developed, well-nourished, 34 y.o. year old female, in no acute distress. Makes good eye contact, is  alert, interactive, and cooperative. Demonstrates recent & remote memory recall. Subjective information consistent with objective assessment. *Assessment limited due to virtual visit.    Neurological Exam  Cranial Nerves  CN III, IV, VI: Extraocular movements intact bilaterally. Normal lids and orbits bilaterally.  CN VII: Full and symmetric facial movement.  CN VIII: Hearing is normal.    RECORDS REVIEW:  Previous records (provider notes, laboratory reports, and imaging studies were reviewed and summarized).    Visit note/ Dr. Do/ neurosurgery.  Encounter Date: 11/26/2024     On physical exam, the patient is alert and interactive.  Extraocular movements are intact.  Face moves symmetrically.  She moves all extremities symmetrically.     CAT scans and MRIs of the head that I personally reviewed demonstrates mild to moderate diffuse ventriculomegaly.  Her last CAT scan was in 2019.  The findings however go back to her first cranial imaging which was an MRI in 2006.  These appear unchanged over the years.     I am concerned about the possibility that the patient has developed symptomatic hydrocephalus.  We will obtain a new CAT scan of the head to evaluate the size and configuration of her ventricles.  I would also like for her to arrange to have her most recent ophthalmologic exam sent to us to see if there is any evidence of papilledema.  If necessary, she may require a lumbar puncture to measure her opening pressure before determining whether or not she requires any surgical intervention.     ADDENDUM: Optho exam does not have any papilledema.  As such, I have low concern for intracranial hypertension.  Patient may follow up if new issues arise.                 Electronically signed by Geoff Do MD at 11/26/2024  2:57 PM  Electronically signed by Geoff Do MD at 12/19/2024  2:53 PM    Assessment & Plan  Migraine without aura and without status migrainosus, not intractable         Chronic cluster  headache, not intractable         Vitamin D3 deficiency         Hydrocephalus, adult (Multi)          ASSESSMENT/PLAN:  Continue current medication regimen  Continue to follow with neurosurgery  Follow-up 6 months    I personally spent 20 minutes today, exclusive of procedures, providing care for this patient, including preparation, face to face time, documentation and other services such as review of medical records, diagnostic result, patient education, counseling, coordination of care as specified in the encounter.     Madison Stoll, APRN-CNP

## 2025-03-13 ENCOUNTER — PHARMACY VISIT (OUTPATIENT)
Dept: PHARMACY | Facility: CLINIC | Age: 35
End: 2025-03-13
Payer: MEDICARE

## 2025-03-19 NOTE — PATIENT INSTRUCTIONS
Alexandria    Thank you for attending your virtual visit today with [facility]. It was a pleasure caring for you today. The best way to contact me for medication refills or questions about your care is through [employer]. Otherwise, you can contact the office by phone: [phone].    Madison Stoll, PATRICK-CNP    PLAN:  Continue current medication regimen  Continue to follow with neurosurgery  Follow-up 6 months

## 2025-03-28 DIAGNOSIS — Z30.41 ENCOUNTER FOR SURVEILLANCE OF CONTRACEPTIVE PILLS: ICD-10-CM

## 2025-03-28 RX ORDER — NORETHINDRONE ACETATE/ETHINYL ESTRADIOL 1MG-20MCG
1 KIT ORAL
Qty: 21 TABLET | Refills: 12 | Status: SHIPPED | OUTPATIENT
Start: 2025-03-28

## 2025-04-09 ENCOUNTER — PHARMACY VISIT (OUTPATIENT)
Dept: PHARMACY | Facility: CLINIC | Age: 35
End: 2025-04-09
Payer: MEDICARE

## 2025-04-09 ENCOUNTER — SPECIALTY PHARMACY (OUTPATIENT)
Dept: PHARMACY | Facility: CLINIC | Age: 35
End: 2025-04-09

## 2025-04-09 ENCOUNTER — APPOINTMENT (OUTPATIENT)
Dept: PRIMARY CARE | Facility: CLINIC | Age: 35
End: 2025-04-09
Payer: COMMERCIAL

## 2025-04-09 PROCEDURE — RXMED WILLOW AMBULATORY MEDICATION CHARGE

## 2025-04-10 ENCOUNTER — OFFICE VISIT (OUTPATIENT)
Dept: PRIMARY CARE | Facility: CLINIC | Age: 35
End: 2025-04-10
Payer: COMMERCIAL

## 2025-04-10 VITALS
HEART RATE: 92 BPM | DIASTOLIC BLOOD PRESSURE: 82 MMHG | SYSTOLIC BLOOD PRESSURE: 121 MMHG | HEIGHT: 71 IN | WEIGHT: 200 LBS | BODY MASS INDEX: 28 KG/M2 | TEMPERATURE: 98.3 F | OXYGEN SATURATION: 98 %

## 2025-04-10 DIAGNOSIS — N76.0 ACUTE VAGINITIS: ICD-10-CM

## 2025-04-10 DIAGNOSIS — R30.0 DYSURIA: Primary | ICD-10-CM

## 2025-04-10 LAB
POC APPEARANCE, URINE: CLEAR
POC BILIRUBIN, URINE: NEGATIVE
POC BLOOD, URINE: NEGATIVE
POC COLOR, URINE: YELLOW
POC GLUCOSE, URINE: NEGATIVE MG/DL
POC KETONES, URINE: NEGATIVE MG/DL
POC LEUKOCYTES, URINE: ABNORMAL
POC NITRITE,URINE: NEGATIVE
POC PH, URINE: 6 PH
POC PROTEIN, URINE: NEGATIVE MG/DL
POC SPECIFIC GRAVITY, URINE: 1.02
POC UROBILINOGEN, URINE: 0.2 EU/DL

## 2025-04-10 PROCEDURE — 99213 OFFICE O/P EST LOW 20 MIN: CPT | Performed by: FAMILY MEDICINE

## 2025-04-10 PROCEDURE — 81003 URINALYSIS AUTO W/O SCOPE: CPT | Performed by: FAMILY MEDICINE

## 2025-04-10 PROCEDURE — 1036F TOBACCO NON-USER: CPT | Performed by: FAMILY MEDICINE

## 2025-04-10 PROCEDURE — 3008F BODY MASS INDEX DOCD: CPT | Performed by: FAMILY MEDICINE

## 2025-04-10 ASSESSMENT — ENCOUNTER SYMPTOMS
CHILLS: 1
FEVER: 0

## 2025-04-10 ASSESSMENT — PATIENT HEALTH QUESTIONNAIRE - PHQ9
SUM OF ALL RESPONSES TO PHQ9 QUESTIONS 1 AND 2: 0
2. FEELING DOWN, DEPRESSED OR HOPELESS: NOT AT ALL
1. LITTLE INTEREST OR PLEASURE IN DOING THINGS: NOT AT ALL

## 2025-04-10 NOTE — PATIENT INSTRUCTIONS
Will send urine for culture  Vaginal culture    Recommend daily probiotics  Can continue cranberry also

## 2025-04-10 NOTE — PROGRESS NOTES
"Subjective   Patient ID: Alexandria Dinero is a 34 y.o. female who presents for UTI. Has had sx for last 4 days. Abd and back pain. Been taking cranberry pills & vit C .      Vaginal:Last several days  Some odor   Itching     No frequency   Burning     Fatigue     Just finished her last period  Had seen urology in the past was taking cranberry routinely             Review of Systems   Constitutional:  Positive for chills. Negative for fever.       Objective   /82   Pulse 92   Temp 36.8 °C (98.3 °F)   Ht 1.803 m (5' 11\")   Wt 90.7 kg (200 lb)   SpO2 98%   BMI 27.89 kg/m²     Physical Exam  Constitutional:       Appearance: Normal appearance. She is well-developed.   Genitourinary:     Comments:   Mild vulvar erythema  Thin white discharge in the vagina  Neurological:      General: No focal deficit present.      Mental Status: She is alert.   Psychiatric:         Mood and Affect: Mood normal.         Behavior: Behavior normal.         Assessment/Plan   Problem List Items Addressed This Visit    None  Visit Diagnoses         Codes    Dysuria    -  Primary R30.0    Relevant Orders    POCT UA Automated manually resulted    Urine Culture    Vaginitis Gram Stain For Bacterial Vaginosis + Yeast    Acute vaginitis     N76.0    Relevant Orders    Vaginitis Gram Stain For Bacterial Vaginosis + Yeast               "

## 2025-04-12 LAB
BACTERIA UR CULT: NORMAL
BACTERIA UR CULT: NORMAL
BV SCORE VAG QL: NORMAL

## 2025-04-13 DIAGNOSIS — N76.0 VAGINOSIS: Primary | ICD-10-CM

## 2025-04-13 RX ORDER — METRONIDAZOLE 7.5 MG/G
1 GEL VAGINAL NIGHTLY
Qty: 70 G | Refills: 0 | Status: SHIPPED | OUTPATIENT
Start: 2025-04-13 | End: 2025-04-18

## 2025-04-13 NOTE — RESULT ENCOUNTER NOTE
Results were intermediate, based on appearance and sxs will treat for bacteria ,  topical prescription sent it

## 2025-04-14 ENCOUNTER — TELEPHONE (OUTPATIENT)
Dept: PRIMARY CARE | Facility: CLINIC | Age: 35
End: 2025-04-14
Payer: COMMERCIAL

## 2025-04-14 NOTE — TELEPHONE ENCOUNTER
Result Communication    No results found from the In Basket message.    9:07 AM      Results were successfully communicated with the patient and they acknowledged their understanding.

## 2025-04-15 ENCOUNTER — SPECIALTY PHARMACY (OUTPATIENT)
Dept: PHARMACY | Facility: CLINIC | Age: 35
End: 2025-04-15

## 2025-04-15 DIAGNOSIS — G44.029 CHRONIC CLUSTER HEADACHE, NOT INTRACTABLE: ICD-10-CM

## 2025-04-15 RX ORDER — KETOROLAC TROMETHAMINE 10 MG/1
TABLET, FILM COATED ORAL
COMMUNITY
Start: 2025-01-24

## 2025-04-15 RX ORDER — GALCANEZUMAB 100 MG/ML
300 INJECTION, SOLUTION SUBCUTANEOUS
Qty: 3 EACH | Refills: 3 | Status: SHIPPED | OUTPATIENT
Start: 2025-04-15

## 2025-04-15 RX ORDER — INDOMETHACIN 25 MG/1
CAPSULE ORAL EVERY 12 HOURS
COMMUNITY

## 2025-04-15 RX ORDER — NORETHINDRONE ACETATE AND ETHINYL ESTRADIOL AND FERROUS FUMARATE 1MG-20(24)
1 KIT ORAL
COMMUNITY
Start: 2025-03-08

## 2025-04-17 ENCOUNTER — SPECIALTY PHARMACY (OUTPATIENT)
Dept: PHARMACY | Facility: CLINIC | Age: 35
End: 2025-04-17

## 2025-04-21 PROBLEM — M79.10 MUSCLE PAIN: Status: ACTIVE | Noted: 2025-04-21

## 2025-04-21 PROBLEM — K52.9 INFLAMMATION OF STOMACH AND INTESTINE: Status: RESOLVED | Noted: 2025-04-21 | Resolved: 2025-04-21

## 2025-04-21 PROBLEM — D69.6 LOW PLATELET COUNT (CMS-HCC): Status: ACTIVE | Noted: 2025-04-21

## 2025-04-21 PROBLEM — R51.9 HEADACHE: Status: RESOLVED | Noted: 2025-04-21 | Resolved: 2025-04-21

## 2025-04-21 PROBLEM — R42 DIZZINESS: Status: RESOLVED | Noted: 2025-04-21 | Resolved: 2025-04-21

## 2025-04-21 PROBLEM — J34.9 DISORDER OF PARANASAL SINUS: Status: ACTIVE | Noted: 2025-04-21

## 2025-04-21 PROBLEM — R00.2 PALPITATIONS: Status: ACTIVE | Noted: 2025-04-21

## 2025-04-21 PROBLEM — N64.4 BREAST PAIN: Status: ACTIVE | Noted: 2025-04-21

## 2025-04-21 PROBLEM — M54.12 CERVICAL RADICULOPATHY: Status: ACTIVE | Noted: 2025-04-21

## 2025-04-21 PROBLEM — R06.02 SHORTNESS OF BREATH: Status: ACTIVE | Noted: 2025-04-21

## 2025-04-21 PROBLEM — N97.9 FEMALE INFERTILITY: Status: ACTIVE | Noted: 2025-04-21

## 2025-04-21 PROBLEM — N76.0 VAGINITIS: Status: RESOLVED | Noted: 2025-04-21 | Resolved: 2025-04-21

## 2025-04-23 ASSESSMENT — ENCOUNTER SYMPTOMS
LIGHT-HEADEDNESS: 0
FEVER: 0
PALPITATIONS: 0
DIZZINESS: 0
FATIGUE: 0
ABDOMINAL PAIN: 0
VOMITING: 0
DIARRHEA: 0
SHORTNESS OF BREATH: 0
NAUSEA: 0
COUGH: 0
CONSTIPATION: 0

## 2025-04-24 ENCOUNTER — APPOINTMENT (OUTPATIENT)
Facility: CLINIC | Age: 35
End: 2025-04-24
Payer: COMMERCIAL

## 2025-04-24 VITALS
HEIGHT: 71 IN | SYSTOLIC BLOOD PRESSURE: 130 MMHG | DIASTOLIC BLOOD PRESSURE: 74 MMHG | WEIGHT: 202 LBS | BODY MASS INDEX: 28.28 KG/M2

## 2025-04-24 DIAGNOSIS — Z12.4 ENCOUNTER FOR SCREENING FOR CERVICAL CANCER: ICD-10-CM

## 2025-04-24 DIAGNOSIS — Z01.419 WELL WOMAN EXAM WITH ROUTINE GYNECOLOGICAL EXAM: Primary | ICD-10-CM

## 2025-04-24 PROCEDURE — 3008F BODY MASS INDEX DOCD: CPT | Performed by: ADVANCED PRACTICE MIDWIFE

## 2025-04-24 PROCEDURE — 1036F TOBACCO NON-USER: CPT | Performed by: ADVANCED PRACTICE MIDWIFE

## 2025-04-24 PROCEDURE — 99395 PREV VISIT EST AGE 18-39: CPT | Performed by: ADVANCED PRACTICE MIDWIFE

## 2025-04-24 ASSESSMENT — PATIENT HEALTH QUESTIONNAIRE - PHQ9
2. FEELING DOWN, DEPRESSED OR HOPELESS: NOT AT ALL
1. LITTLE INTEREST OR PLEASURE IN DOING THINGS: NOT AT ALL
SUM OF ALL RESPONSES TO PHQ9 QUESTIONS 1 & 2: 0

## 2025-04-24 ASSESSMENT — ENCOUNTER SYMPTOMS
DEPRESSION: 0
LOSS OF SENSATION IN FEET: 0
OCCASIONAL FEELINGS OF UNSTEADINESS: 0

## 2025-04-24 NOTE — PROGRESS NOTES
Subjective   Patient ID: Alexandria Dinero is a 34 y.o. female who presents for Annual Exam (Pt here for annual exam./Mostly normal periods using ocp./Last pap was 10/2021 normal hpv neg).  HPI    33 y/o presents to office for well women exam.   Periods are usually monthly and regular, but does skip occasionally. On OCP for PMDD which helps. Overall happy and would like to continue.   Last pap 2021 normal, HPV neg.   Consultation for IVF next month.       Review of Systems   Constitutional:  Negative for fatigue and fever.   Respiratory:  Negative for cough and shortness of breath.    Cardiovascular:  Negative for chest pain and palpitations.   Gastrointestinal:  Negative for abdominal pain, constipation, diarrhea, nausea and vomiting.   Endocrine: Negative for cold intolerance and heat intolerance.   Genitourinary:  Negative for dyspareunia, pelvic pain, vaginal discharge and vaginal pain.   Neurological:  Negative for dizziness and light-headedness.       Objective   Physical Exam  Vitals reviewed.   Constitutional:       Appearance: Normal appearance.   Neck:      Thyroid: No thyroid mass, thyromegaly or thyroid tenderness.   Cardiovascular:      Rate and Rhythm: Normal rate and regular rhythm.      Heart sounds: Normal heart sounds.   Pulmonary:      Effort: Pulmonary effort is normal.      Breath sounds: Normal breath sounds.   Chest:   Breasts:     Right: Normal. No mass.      Left: Normal. No mass.   Abdominal:      General: Bowel sounds are normal.      Palpations: Abdomen is soft.      Tenderness: There is no abdominal tenderness.   Genitourinary:     General: Normal vulva.      Vagina: Normal.      Cervix: Normal.      Uterus: Normal.       Adnexa: Right adnexa normal.   Musculoskeletal:         General: Normal range of motion.      Cervical back: No tenderness.   Skin:     General: Skin is warm.   Neurological:      General: No focal deficit present.      Mental Status: She is alert and oriented to person,  place, and time.   Psychiatric:         Attention and Perception: Attention normal.         Behavior: Behavior normal.         Assessment/Plan   Diagnoses and all orders for this visit:  Well woman exam with routine gynecological exam  Encounter for screening for cervical cancer  -     THINPREP PAP TEST (>30)           OLGA Arguello 04/24/25 2:32 PM

## 2025-04-25 ENCOUNTER — APPOINTMENT (OUTPATIENT)
Dept: PRIMARY CARE | Facility: CLINIC | Age: 35
End: 2025-04-25
Payer: COMMERCIAL

## 2025-05-05 ENCOUNTER — SPECIALTY PHARMACY (OUTPATIENT)
Dept: PHARMACY | Facility: CLINIC | Age: 35
End: 2025-05-05

## 2025-05-05 PROCEDURE — RXMED WILLOW AMBULATORY MEDICATION CHARGE

## 2025-05-08 ENCOUNTER — PHARMACY VISIT (OUTPATIENT)
Dept: PHARMACY | Facility: CLINIC | Age: 35
End: 2025-05-08
Payer: MEDICARE

## 2025-05-08 ENCOUNTER — SPECIALTY PHARMACY (OUTPATIENT)
Dept: PHARMACY | Facility: HOSPITAL | Age: 35
End: 2025-05-08
Payer: COMMERCIAL

## 2025-05-08 ENCOUNTER — TELEMEDICINE CLINICAL SUPPORT (OUTPATIENT)
Dept: PHARMACY | Facility: HOSPITAL | Age: 35
End: 2025-05-08
Payer: COMMERCIAL

## 2025-05-08 DIAGNOSIS — G44.029 CHRONIC CLUSTER HEADACHE, NOT INTRACTABLE: ICD-10-CM

## 2025-05-08 RX ORDER — GALCANEZUMAB 100 MG/ML
300 INJECTION, SOLUTION SUBCUTANEOUS
Qty: 3 EACH | Refills: 2 | Status: SHIPPED | OUTPATIENT
Start: 2025-05-08

## 2025-05-08 NOTE — PROGRESS NOTES
"[facility] Clinical Note  Patient Reassessment     Introduction  Alexandria Dinero is a 34 y.o. female who is on the specialty pharmacy service for management of: Migraine Core.      [facility] supplied medication: Emgality    Duration of therapy: Maintenance    The most recent encounter visit with the referring prescriber KYLE Mccloud  on 03/11/2025 was reviewed.  Pharmacy will continue to collaborate in the care of this patient with the referring prescriber.    Discussion  Alexandria was contacted on 5/8/2025 at 10:54 AM for a pharmacy visit with encounter number [id number] from:   [facility]  [address]  [address]  [facility] [address]  Dept: [phone]  Dept Fax: [phone]  Loc: [phone]  Alexandria consented to a/an Telephone visit, which was performed.    Efficacy  Patient has developed new symptoms of condition: No  Patient/caregiver feels medication is affecting the disease state: Yes    Goals  Provided education on goals and possible outcomes of therapy:  Adherence with therapy  Timely completion of appropriate labs  Timely and appropriate follow up with provider  Identify and address medication interactions with presciption medications, OTC medications and supplements  Optimize or maintain quality of life  Neurology- Migraine: 50% reduction in migraine days each month from baseline  Decreased use of \"prn\" agents to treat migraine headaches  Improvement in MIDAS score from baseline  Patient has documented target(s) for goals of therapy: Yes    Tolerance  Patient has experienced side effects from this medication: No  Changes to current therapy regimen: No    The follow-up timeline was discussed. Every person responds to and reacts to therapy differently. Patient should be assessed for efficacy and tolerability in approximately: 3 months    Adherence  Patient Information  Informant: Self (Patient)  Demonstrates " Understanding of Importance of Adherence: Yes  Does the patient have any barriers to self-administration (including physical and mental?): No  Barriers to Self-Administration: none  Action Taken to Mitigate Barriers for Self-Administration: N/A  Medication Information  Medication: galcanezumab-gnlm (Emgality Syringe)  Patient Reported Missed Doses in the Last 4 Weeks: 0  Estimated Medication Adherence Level: Good  Adherence Estimation Source: Claims history  Barriers to Adherence: No Problems identified  Action Taken to Address Barriers to Adherence: N/A  What concerns do you have regarding your medications?: none   The importance of adherence was discussed and patient/caregiver was advised to take the medication as prescribed by their provider. Encouraged patient/caregiver to call physician's office or specialty pharmacy if they have a question regarding a missed dose.    General Assessment  Changes to home medications, OTCs or supplements: No  Current Medications[1]  Reported new allergies: No  Reported new medical conditions: No  Additional monitoring reviewed: Neurology - Migraine - There are no routine laboratory monitoring parameters for this medication  Is laboratory follow up needed? No    Advised to contact the pharmacy if there are any changes to the patient's medication list, including prescriptions, OTC medications, herbal products, or supplements.    Impression/Plan  This patient has not been identified as high risk due to Lack of high risk qualifiers.  The following action was taken: N/A.    QOL/Patient Satisfaction  Rate your quality of life on scale of 1-10: 9  Rate your satisfaction with  Specialty Pharmacy on scale of 1-10: 10 - Completely satisfied    Provided contact information ([phone]) for [facility] and reviewed dispensing process, refill timeline and patient management follow up. Confirmed understanding of education conducted during assessment. All  questions and concerns were addressed and patient/caregiver was encouraged to reach out for additional questions or concerns.    Based on the patient's diagnosis, medication list, progress towards goals, adherence, tolerance, and medication list, medication remains appropriate: Therapy remains appropriate (I attest)    Melissa Sung, PharmD        [1]   Current Outpatient Medications   Medication Sig Dispense Refill    amitriptyline (Elavil) 10 mg tablet Take 1 tablet (10 mg) by mouth as needed at bedtime.      galcanezumab (Emgality Syringe) 300 mg/3 mL (100 mg/mL x 3) prefilled syringe Inject 3 Syringes (300 mg) under the skin every 28 (twenty-eight) days. 3 each 3    ibuprofen 800 mg tablet Ibuprofen 800 MG Oral Tablet  Quantity: 16   Refills: 0  Start: 9-Dec-2022      indomethacin (Indocin) 25 mg capsule Take by mouth every 12 hours. (Patient not taking: Reported on 4/24/2025)      Junel Fe 24 1 mg-20 mcg (24)/75 mg (4) tablet Take 1 tablet by mouth early in the morning.. (Patient not taking: Reported on 4/24/2025)      ketorolac (Toradol) 10 mg tablet TAKE 1 TABLET BY MOUTH THREE TIMES DAILY AS NEEDED for 5 (FIVE) days (Patient not taking: Reported on 4/24/2025)      Sheri 1/20, 21, 1-20 mg-mcg tablet Take 1 tablet by mouth early in the morning.. 21 tablet 12    rimegepant (Nurtec ODT) 75 mg tablet,disintegrating Take 1 tablet (75 mg) by mouth once daily as needed for acute migraine. No more than 1 dose in 24 hours. 8 tablet 11    SUMAtriptan (Imitrex) 100 mg tablet Take 1 tablet (100 mg) by mouth 1 time if needed for migraine (may repeat x1). 9 tablet 6    SUMAtriptan succinate 4 mg/0.5 mL pen injector Take one injection at onset of migraine.  May repeat in 15 minutes times 2. No more than 3 doses in 24 hour period       No current facility-administered medications for this visit.

## 2025-05-13 LAB
CYTOLOGY CMNT CVX/VAG CYTO-IMP: NORMAL
HPV HR 12 DNA GENITAL QL NAA+PROBE: NEGATIVE
HPV HR GENOTYPES PNL CVX NAA+PROBE: NEGATIVE
HPV16 DNA SPEC QL NAA+PROBE: NEGATIVE
HPV18 DNA SPEC QL NAA+PROBE: NEGATIVE
LAB AP HPV GENOTYPE QUESTION: YES
LAB AP HPV HR: NORMAL
LABORATORY COMMENT REPORT: NORMAL
PATH REPORT.TOTAL CANCER: NORMAL

## 2025-05-29 PROCEDURE — RXMED WILLOW AMBULATORY MEDICATION CHARGE

## 2025-06-09 ENCOUNTER — PHARMACY VISIT (OUTPATIENT)
Dept: PHARMACY | Facility: CLINIC | Age: 35
End: 2025-06-09
Payer: MEDICARE

## 2025-06-16 ENCOUNTER — TELEPHONE (OUTPATIENT)
Dept: PRIMARY CARE | Facility: CLINIC | Age: 35
End: 2025-06-16

## 2025-06-16 NOTE — TELEPHONE ENCOUNTER
Pt came in office asking if DrC will put in lab orders for pt. Pt was last seen in April 2025.  Please advise pt [phone] (pt was advised DRC was out until 06/23/2025)

## 2025-07-08 ENCOUNTER — SPECIALTY PHARMACY (OUTPATIENT)
Dept: PHARMACY | Facility: CLINIC | Age: 35
End: 2025-07-08

## 2025-07-08 PROCEDURE — RXMED WILLOW AMBULATORY MEDICATION CHARGE

## 2025-07-09 ENCOUNTER — PHARMACY VISIT (OUTPATIENT)
Dept: PHARMACY | Facility: CLINIC | Age: 35
End: 2025-07-09
Payer: MEDICARE

## 2025-08-01 PROCEDURE — RXMED WILLOW AMBULATORY MEDICATION CHARGE

## 2025-08-05 ENCOUNTER — SPECIALTY PHARMACY (OUTPATIENT)
Dept: PHARMACY | Facility: CLINIC | Age: 35
End: 2025-08-05

## 2025-08-06 ENCOUNTER — PHARMACY VISIT (OUTPATIENT)
Dept: PHARMACY | Facility: CLINIC | Age: 35
End: 2025-08-06
Payer: MEDICARE

## 2025-08-06 DIAGNOSIS — G44.029 CHRONIC CLUSTER HEADACHE, NOT INTRACTABLE: ICD-10-CM

## 2025-08-07 RX ORDER — GALCANEZUMAB 100 MG/ML
300 INJECTION, SOLUTION SUBCUTANEOUS
Qty: 3 EACH | Refills: 2 | Status: SHIPPED | OUTPATIENT
Start: 2025-08-07

## 2025-08-13 ENCOUNTER — OFFICE VISIT (OUTPATIENT)
Dept: URGENT CARE | Age: 35
End: 2025-08-13
Payer: COMMERCIAL

## 2025-08-13 ENCOUNTER — ANCILLARY PROCEDURE (OUTPATIENT)
Dept: URGENT CARE | Age: 35
End: 2025-08-13
Payer: COMMERCIAL

## 2025-08-13 VITALS
SYSTOLIC BLOOD PRESSURE: 114 MMHG | OXYGEN SATURATION: 100 % | TEMPERATURE: 98.6 F | WEIGHT: 205 LBS | BODY MASS INDEX: 28.7 KG/M2 | HEART RATE: 96 BPM | DIASTOLIC BLOOD PRESSURE: 65 MMHG | HEIGHT: 71 IN | RESPIRATION RATE: 16 BRPM

## 2025-08-13 DIAGNOSIS — S93.491A SPRAIN OF ANTERIOR TALOFIBULAR LIGAMENT OF RIGHT ANKLE, INITIAL ENCOUNTER: Primary | ICD-10-CM

## 2025-08-13 DIAGNOSIS — M25.571 ACUTE RIGHT ANKLE PAIN: ICD-10-CM

## 2025-08-13 PROCEDURE — 73610 X-RAY EXAM OF ANKLE: CPT | Mod: RIGHT SIDE

## 2025-08-13 PROCEDURE — 1036F TOBACCO NON-USER: CPT

## 2025-08-13 PROCEDURE — 3008F BODY MASS INDEX DOCD: CPT

## 2025-08-13 PROCEDURE — 99203 OFFICE O/P NEW LOW 30 MIN: CPT

## 2025-08-28 ENCOUNTER — OFFICE VISIT (OUTPATIENT)
Dept: PRIMARY CARE | Facility: CLINIC | Age: 35
End: 2025-08-28
Payer: COMMERCIAL

## 2025-08-28 VITALS
DIASTOLIC BLOOD PRESSURE: 74 MMHG | HEIGHT: 71 IN | TEMPERATURE: 97.3 F | HEART RATE: 87 BPM | SYSTOLIC BLOOD PRESSURE: 115 MMHG | WEIGHT: 211 LBS | OXYGEN SATURATION: 98 % | BODY MASS INDEX: 29.54 KG/M2

## 2025-08-28 DIAGNOSIS — N93.8 DUB (DYSFUNCTIONAL UTERINE BLEEDING): ICD-10-CM

## 2025-08-28 DIAGNOSIS — Z13.820 SCREENING FOR OSTEOPOROSIS: ICD-10-CM

## 2025-08-28 DIAGNOSIS — Z79.899 ENCOUNTER FOR LONG-TERM (CURRENT) USE OF MEDICATIONS: ICD-10-CM

## 2025-08-28 DIAGNOSIS — S92.001A CLOSED NONDISPLACED FRACTURE OF RIGHT CALCANEUS, UNSPECIFIED PORTION OF CALCANEUS, INITIAL ENCOUNTER: Primary | ICD-10-CM

## 2025-08-28 DIAGNOSIS — R53.83 FATIGUE, UNSPECIFIED TYPE: ICD-10-CM

## 2025-08-28 LAB
POC APPEARANCE, URINE: CLEAR
POC BILIRUBIN, URINE: NEGATIVE
POC BLOOD, URINE: NEGATIVE
POC COLOR, URINE: YELLOW
POC GLUCOSE, URINE: NEGATIVE MG/DL
POC KETONES, URINE: NEGATIVE MG/DL
POC LEUKOCYTES, URINE: ABNORMAL
POC NITRITE,URINE: NEGATIVE
POC PH, URINE: 5.5 PH
POC PROTEIN, URINE: NEGATIVE MG/DL
POC SPECIFIC GRAVITY, URINE: 1.01
POC UROBILINOGEN, URINE: 0.2 EU/DL

## 2025-08-28 PROCEDURE — 3008F BODY MASS INDEX DOCD: CPT | Performed by: FAMILY MEDICINE

## 2025-08-28 PROCEDURE — 1036F TOBACCO NON-USER: CPT | Performed by: FAMILY MEDICINE

## 2025-08-28 PROCEDURE — 99214 OFFICE O/P EST MOD 30 MIN: CPT | Performed by: FAMILY MEDICINE

## 2025-08-28 PROCEDURE — 81003 URINALYSIS AUTO W/O SCOPE: CPT | Performed by: FAMILY MEDICINE

## 2025-08-28 RX ORDER — BUTYROSPERMUM PARKII(SHEA BUTTER), SIMMONDSIA CHINENSIS (JOJOBA) SEED OIL, ALOE BARBADENSIS LEAF EXTRACT .01; 1; 3.5 G/100G; G/100G; G/100G
250 LIQUID TOPICAL 2 TIMES DAILY
COMMUNITY

## 2025-08-28 ASSESSMENT — ENCOUNTER SYMPTOMS: DYSURIA: 0

## 2025-08-28 ASSESSMENT — PATIENT HEALTH QUESTIONNAIRE - PHQ9
2. FEELING DOWN, DEPRESSED OR HOPELESS: NOT AT ALL
SUM OF ALL RESPONSES TO PHQ9 QUESTIONS 1 AND 2: 0
1. LITTLE INTEREST OR PLEASURE IN DOING THINGS: NOT AT ALL

## 2025-08-29 LAB
25(OH)D3+25(OH)D2 SERPL-MCNC: 64 NG/ML (ref 30–100)
ALBUMIN SERPL-MCNC: 4.5 G/DL (ref 3.6–5.1)
ALP SERPL-CCNC: 49 U/L (ref 31–125)
ALT SERPL-CCNC: 18 U/L (ref 6–29)
ANION GAP SERPL CALCULATED.4IONS-SCNC: 11 MMOL/L (CALC) (ref 7–17)
AST SERPL-CCNC: 17 U/L (ref 10–30)
BASOPHILS # BLD AUTO: 41 CELLS/UL (ref 0–200)
BASOPHILS NFR BLD AUTO: 0.4 %
BILIRUB SERPL-MCNC: 0.3 MG/DL (ref 0.2–1.2)
BUN SERPL-MCNC: 14 MG/DL (ref 7–25)
CALCIUM SERPL-MCNC: 9.4 MG/DL (ref 8.6–10.2)
CHLORIDE SERPL-SCNC: 104 MMOL/L (ref 98–110)
CO2 SERPL-SCNC: 25 MMOL/L (ref 20–32)
CREAT SERPL-MCNC: 0.72 MG/DL (ref 0.5–0.97)
EGFRCR SERPLBLD CKD-EPI 2021: 110 ML/MIN/1.73M2
EOSINOPHIL # BLD AUTO: 82 CELLS/UL (ref 15–500)
EOSINOPHIL NFR BLD AUTO: 0.8 %
ERYTHROCYTE [DISTWIDTH] IN BLOOD BY AUTOMATED COUNT: 11.6 % (ref 11–15)
FSH SERPL-ACNC: 6.2 MIU/ML
GLUCOSE SERPL-MCNC: 109 MG/DL (ref 65–99)
HCT VFR BLD AUTO: 40.3 % (ref 35–45)
HGB BLD-MCNC: 13.8 G/DL (ref 11.7–15.5)
LH SERPL-ACNC: 11.7 MIU/ML
LYMPHOCYTES # BLD AUTO: 3009 CELLS/UL (ref 850–3900)
LYMPHOCYTES NFR BLD AUTO: 29.5 %
MCH RBC QN AUTO: 32.4 PG (ref 27–33)
MCHC RBC AUTO-ENTMCNC: 34.2 G/DL (ref 32–36)
MCV RBC AUTO: 94.6 FL (ref 80–100)
MONOCYTES # BLD AUTO: 643 CELLS/UL (ref 200–950)
MONOCYTES NFR BLD AUTO: 6.3 %
NEUTROPHILS # BLD AUTO: 6426 CELLS/UL (ref 1500–7800)
NEUTROPHILS NFR BLD AUTO: 63 %
PLATELET # BLD AUTO: 285 THOUSAND/UL (ref 140–400)
PMV BLD REES-ECKER: 11.6 FL (ref 7.5–12.5)
POTASSIUM SERPL-SCNC: 3.9 MMOL/L (ref 3.5–5.3)
PROLACTIN SERPL-MCNC: 14.7 NG/ML
PROT SERPL-MCNC: 7.6 G/DL (ref 6.1–8.1)
RBC # BLD AUTO: 4.26 MILLION/UL (ref 3.8–5.1)
SODIUM SERPL-SCNC: 140 MMOL/L (ref 135–146)
T4 FREE SERPL-MCNC: 1.2 NG/DL (ref 0.8–1.8)
TSH SERPL-ACNC: 1.62 MIU/L
WBC # BLD AUTO: 10.2 THOUSAND/UL (ref 3.8–10.8)

## 2025-09-02 ENCOUNTER — SPECIALTY PHARMACY (OUTPATIENT)
Dept: PHARMACY | Facility: CLINIC | Age: 35
End: 2025-09-02

## 2025-09-02 PROCEDURE — RXMED WILLOW AMBULATORY MEDICATION CHARGE

## 2025-09-04 ENCOUNTER — PHARMACY VISIT (OUTPATIENT)
Dept: PHARMACY | Facility: CLINIC | Age: 35
End: 2025-09-04
Payer: MEDICARE

## 2026-04-28 ENCOUNTER — APPOINTMENT (OUTPATIENT)
Facility: CLINIC | Age: 36
End: 2026-04-28
Payer: COMMERCIAL